# Patient Record
Sex: MALE | Race: WHITE | Employment: FULL TIME | URBAN - METROPOLITAN AREA
[De-identification: names, ages, dates, MRNs, and addresses within clinical notes are randomized per-mention and may not be internally consistent; named-entity substitution may affect disease eponyms.]

---

## 2017-01-31 ENCOUNTER — HOSPITAL ENCOUNTER (OUTPATIENT)
Dept: INFUSION THERAPY | Age: 57
Discharge: HOME | End: 2017-01-31
Attending: INTERNAL MEDICINE | Admitting: INTERNAL MEDICINE

## 2017-01-31 VITALS
DIASTOLIC BLOOD PRESSURE: 78 MMHG | SYSTOLIC BLOOD PRESSURE: 114 MMHG | HEART RATE: 94 BPM | TEMPERATURE: 98.5 F | RESPIRATION RATE: 18 BRPM

## 2017-01-31 ASSESSMENT — PAIN SCALES - GENERAL: PAINLEVEL_OUTOF10: 0

## 2017-03-14 ENCOUNTER — HOSPITAL ENCOUNTER (OUTPATIENT)
Dept: INFUSION THERAPY | Age: 57
Discharge: HOME | End: 2017-03-14
Attending: INTERNAL MEDICINE | Admitting: INTERNAL MEDICINE

## 2017-03-14 VITALS
HEART RATE: 74 BPM | TEMPERATURE: 98 F | SYSTOLIC BLOOD PRESSURE: 156 MMHG | RESPIRATION RATE: 20 BRPM | DIASTOLIC BLOOD PRESSURE: 85 MMHG

## 2017-03-14 ASSESSMENT — PAIN SCALES - GENERAL: PAINLEVEL_OUTOF10: 0

## 2017-04-25 ENCOUNTER — HOSPITAL ENCOUNTER (OUTPATIENT)
Dept: OTHER | Age: 57
Discharge: HOME | End: 2017-04-25
Attending: INTERNAL MEDICINE | Admitting: INTERNAL MEDICINE

## 2017-04-25 ENCOUNTER — HOSPITAL ENCOUNTER (OUTPATIENT)
Dept: INFUSION THERAPY | Age: 57
Discharge: HOME | End: 2017-04-25
Attending: INTERNAL MEDICINE | Admitting: INTERNAL MEDICINE

## 2017-04-25 VITALS — SYSTOLIC BLOOD PRESSURE: 143 MMHG | RESPIRATION RATE: 16 BRPM | HEART RATE: 80 BPM | DIASTOLIC BLOOD PRESSURE: 84 MMHG

## 2017-04-25 LAB
ALBUMIN SERPL-MCNC: 4.3 GM/DL (ref 3.4–5)
ALP BLD-CCNC: 136 IU/L (ref 40–129)
ALT SERPL-CCNC: 35 U/L (ref 10–40)
ANION GAP SERPL CALCULATED.3IONS-SCNC: 13 MMOL/L (ref 4–16)
AST SERPL-CCNC: 37 IU/L (ref 15–37)
BILIRUB SERPL-MCNC: 0.3 MG/DL (ref 0–1)
BUN BLDV-MCNC: 14 MG/DL (ref 6–23)
CALCIUM SERPL-MCNC: 8.8 MG/DL (ref 8.3–10.6)
CHLORIDE BLD-SCNC: 97 MMOL/L (ref 99–110)
CO2: 26 MMOL/L (ref 21–32)
CREAT SERPL-MCNC: 1 MG/DL (ref 0.9–1.3)
GFR AFRICAN AMERICAN: >60 ML/MIN/1.73M2
GFR NON-AFRICAN AMERICAN: >60 ML/MIN/1.73M2
GLUCOSE BLD-MCNC: 248 MG/DL (ref 70–140)
LACTATE DEHYDROGENASE: 179 IU/L (ref 120–246)
POTASSIUM SERPL-SCNC: 4.6 MMOL/L (ref 3.5–5.1)
SODIUM BLD-SCNC: 136 MMOL/L (ref 135–145)
TOTAL PROTEIN: 6.2 GM/DL (ref 6.4–8.2)
URIC ACID: 4.5 MG/DL (ref 3.5–7.2)

## 2017-04-25 ASSESSMENT — PAIN SCALES - GENERAL: PAINLEVEL_OUTOF10: 0

## 2017-06-06 ENCOUNTER — HOSPITAL ENCOUNTER (OUTPATIENT)
Dept: INFUSION THERAPY | Age: 57
Discharge: HOME | End: 2017-07-05
Attending: INTERNAL MEDICINE | Admitting: INTERNAL MEDICINE

## 2017-06-06 VITALS — DIASTOLIC BLOOD PRESSURE: 80 MMHG | RESPIRATION RATE: 20 BRPM | SYSTOLIC BLOOD PRESSURE: 141 MMHG | HEART RATE: 103 BPM

## 2017-06-06 ASSESSMENT — PAIN SCALES - GENERAL: PAINLEVEL_OUTOF10: 0

## 2017-07-10 ENCOUNTER — HOSPITAL ENCOUNTER (OUTPATIENT)
Dept: LAB | Age: 57
Discharge: HOME | End: 2017-07-10
Attending: FAMILY MEDICINE | Admitting: FAMILY MEDICINE

## 2017-07-10 LAB
ALBUMIN SERPL-MCNC: 4.1 GM/DL (ref 3.4–5)
ALP BLD-CCNC: 112 IU/L (ref 40–129)
ALT SERPL-CCNC: 14 U/L (ref 10–40)
ANION GAP SERPL CALCULATED.3IONS-SCNC: 10 MMOL/L (ref 4–16)
AST SERPL-CCNC: 19 IU/L (ref 15–37)
BILIRUB SERPL-MCNC: 0.5 MG/DL (ref 0–1)
BUN BLDV-MCNC: 15 MG/DL (ref 6–23)
CALCIUM SERPL-MCNC: 9.9 MG/DL (ref 8.3–10.6)
CHLORIDE BLD-SCNC: 100 MMOL/L (ref 99–110)
CHOLESTEROL, FASTING: 185 MG/DL
CO2: 27 MMOL/L (ref 21–32)
CREAT SERPL-MCNC: 1 MG/DL (ref 0.9–1.3)
ESTIMATED AVERAGE GLUCOSE: 223 MG/DL
GFR AFRICAN AMERICAN: >60 ML/MIN/1.73M2
GFR NON-AFRICAN AMERICAN: >60 ML/MIN/1.73M2
GLUCOSE FASTING: 177 MG/DL (ref 70–99)
HBA1C MFR BLD: 9.4 % (ref 4.2–6.3)
HDLC SERPL-MCNC: 55 MG/DL
LDL CHOLESTEROL DIRECT: 129 MG/DL
POTASSIUM SERPL-SCNC: 3.6 MMOL/L (ref 3.5–5.1)
PROSTATE SPECIFIC ANTIGEN: 0.41 NG/ML (ref 0–4)
SODIUM BLD-SCNC: 137 MMOL/L (ref 135–145)
TOTAL PROTEIN: 7.1 GM/DL (ref 6.4–8.2)
TRIGLYCERIDE, FASTING: 78 MG/DL

## 2017-07-18 ENCOUNTER — HOSPITAL ENCOUNTER (OUTPATIENT)
Dept: INFUSION THERAPY | Age: 57
Discharge: HOME | End: 2017-07-18
Attending: INTERNAL MEDICINE | Admitting: INTERNAL MEDICINE

## 2017-07-18 VITALS — DIASTOLIC BLOOD PRESSURE: 81 MMHG | HEART RATE: 88 BPM | SYSTOLIC BLOOD PRESSURE: 136 MMHG | RESPIRATION RATE: 18 BRPM

## 2017-07-18 ASSESSMENT — PAIN SCALES - GENERAL: PAINLEVEL_OUTOF10: 0

## 2017-08-29 ENCOUNTER — HOSPITAL ENCOUNTER (OUTPATIENT)
Dept: INFUSION THERAPY | Age: 57
Discharge: HOME | End: 2017-08-29
Attending: INTERNAL MEDICINE | Admitting: INTERNAL MEDICINE

## 2017-08-29 VITALS
DIASTOLIC BLOOD PRESSURE: 73 MMHG | HEART RATE: 97 BPM | OXYGEN SATURATION: 98 % | SYSTOLIC BLOOD PRESSURE: 138 MMHG | RESPIRATION RATE: 18 BRPM

## 2017-10-10 ENCOUNTER — HOSPITAL ENCOUNTER (OUTPATIENT)
Dept: INFUSION THERAPY | Age: 57
Discharge: HOME | End: 2017-10-10
Attending: INTERNAL MEDICINE | Admitting: INTERNAL MEDICINE

## 2017-10-10 ENCOUNTER — HOSPITAL ENCOUNTER (OUTPATIENT)
Dept: OTHER | Age: 57
Discharge: HOME | End: 2017-10-10
Attending: INTERNAL MEDICINE | Admitting: INTERNAL MEDICINE

## 2017-10-10 VITALS
OXYGEN SATURATION: 98 % | HEART RATE: 87 BPM | DIASTOLIC BLOOD PRESSURE: 62 MMHG | RESPIRATION RATE: 18 BRPM | SYSTOLIC BLOOD PRESSURE: 127 MMHG

## 2017-10-10 LAB
ALBUMIN SERPL-MCNC: 4.5 GM/DL (ref 3.4–5)
ALP BLD-CCNC: 104 IU/L (ref 40–129)
ALT SERPL-CCNC: 21 U/L (ref 10–40)
ANION GAP SERPL CALCULATED.3IONS-SCNC: 14 MMOL/L (ref 4–16)
AST SERPL-CCNC: 25 IU/L (ref 15–37)
BILIRUB SERPL-MCNC: 0.3 MG/DL (ref 0–1)
BUN BLDV-MCNC: 13 MG/DL (ref 6–23)
CALCIUM SERPL-MCNC: 9.2 MG/DL (ref 8.3–10.6)
CHLORIDE BLD-SCNC: 99 MMOL/L (ref 99–110)
CO2: 26 MMOL/L (ref 21–32)
CREAT SERPL-MCNC: 1.3 MG/DL (ref 0.9–1.3)
GFR AFRICAN AMERICAN: >60 ML/MIN/1.73M2
GFR NON-AFRICAN AMERICAN: 57 ML/MIN/1.73M2
GLUCOSE BLD-MCNC: 292 MG/DL (ref 70–140)
LACTATE DEHYDROGENASE: 161 IU/L (ref 120–246)
POTASSIUM SERPL-SCNC: 4.9 MMOL/L (ref 3.5–5.1)
SODIUM BLD-SCNC: 139 MMOL/L (ref 135–145)
TOTAL PROTEIN: 6.6 GM/DL (ref 6.4–8.2)
URIC ACID: 4.1 MG/DL (ref 3.5–7.2)

## 2017-10-12 LAB
ALBUMIN ELP: 3.9 GM/DL (ref 3.2–5.6)
ALPHA-1-GLOBULIN: 0.2 GM/DL (ref 0.1–0.4)
ALPHA-2-GLOBULIN: 0.6 GM/DL (ref 0.4–1.2)
BETA GLOBULIN: 1 GM/DL (ref 0.5–1.3)
GAMMA GLOBULIN: 0.9 GM/DL (ref 0.5–1.6)
TOTAL PROTEIN: 6.6 GM/DL (ref 6.4–8.2)

## 2017-11-21 ENCOUNTER — HOSPITAL ENCOUNTER (OUTPATIENT)
Dept: INFUSION THERAPY | Age: 57
Discharge: HOME | End: 2017-11-21
Attending: INTERNAL MEDICINE | Admitting: INTERNAL MEDICINE

## 2017-11-21 VITALS — RESPIRATION RATE: 16 BRPM | SYSTOLIC BLOOD PRESSURE: 133 MMHG | DIASTOLIC BLOOD PRESSURE: 83 MMHG | HEART RATE: 78 BPM

## 2017-11-21 ASSESSMENT — PAIN SCALES - GENERAL: PAINLEVEL_OUTOF10: 0

## 2017-11-21 NOTE — FLOWSHEET NOTE
Diagnosis:Errythrocytosis          Pre-phlebotomy:  Pulse:84  Blood Pressure:  145/71  Post-phlebotomy:  Pulse: 78 Blood Pressure: 133/83  Volume ZSBLEDX:006 gramsComplications:  NoneComments:scale weight 500 grams

## 2017-11-21 NOTE — DISCHARGE SUMMARY
Tolerated Therapeutic Phlebotomy well. Home instructions given with understanding. Discharged walking per self in fair condition  To leave per private auto.

## 2018-01-02 ENCOUNTER — HOSPITAL ENCOUNTER (OUTPATIENT)
Dept: INFUSION THERAPY | Age: 58
Discharge: HOME | End: 2018-01-02
Attending: INTERNAL MEDICINE | Admitting: INTERNAL MEDICINE

## 2018-01-02 VITALS
HEART RATE: 100 BPM | DIASTOLIC BLOOD PRESSURE: 83 MMHG | RESPIRATION RATE: 16 BRPM | OXYGEN SATURATION: 98 % | SYSTOLIC BLOOD PRESSURE: 144 MMHG

## 2018-01-02 NOTE — PROGRESS NOTES
Diagnosis:  erthrocytosis              Pre-phlebotomy:  Pulse:  90   Blood Pressure: 139/80    Post-phlebotomy:  Pulse:  100  Blood Pressure:  144/83    Volume Removed:  [id number]    Complications:  none    Comments:  Draw done by MARIEL Mackey RN

## 2018-01-02 NOTE — PLAN OF CARE
Pt taken to room 00 . Pt oriented to room, call light, bed/chair controls, TV, pt voiced understanding. Plan of care explained to pt, pt voiced understanding.

## 2018-02-13 ENCOUNTER — HOSPITAL ENCOUNTER (OUTPATIENT)
Dept: INFUSION THERAPY | Age: 58
Discharge: HOME | End: 2018-02-13
Attending: INTERNAL MEDICINE | Admitting: INTERNAL MEDICINE

## 2018-02-13 VITALS
DIASTOLIC BLOOD PRESSURE: 75 MMHG | TEMPERATURE: 98.4 F | HEART RATE: 84 BPM | RESPIRATION RATE: 18 BRPM | SYSTOLIC BLOOD PRESSURE: 152 MMHG

## 2018-02-13 ASSESSMENT — PAIN SCALES - GENERAL: PAINLEVEL_OUTOF10: 0

## 2018-02-13 NOTE — PROGRESS NOTES
Pt taken to room 03. Pt oriented to room, call light, bed/chair controls, TV, pt voiced understanding. Plan of care explained to pt, pt voiced understanding.

## 2018-05-08 ENCOUNTER — HOSPITAL ENCOUNTER (OUTPATIENT)
Dept: OTHER | Age: 58
Discharge: HOME | End: 2018-05-08
Attending: INTERNAL MEDICINE | Admitting: INTERNAL MEDICINE

## 2018-05-08 LAB
ALBUMIN SERPL-MCNC: 4.6 GM/DL (ref 3.4–5)
ALP BLD-CCNC: 137 IU/L (ref 40–129)
ALT SERPL-CCNC: 25 U/L (ref 10–40)
ANION GAP SERPL CALCULATED.3IONS-SCNC: 15 MMOL/L (ref 4–16)
AST SERPL-CCNC: 23 IU/L (ref 15–37)
BILIRUB SERPL-MCNC: 0.5 MG/DL (ref 0–1)
BUN BLDV-MCNC: 12 MG/DL (ref 6–23)
CALCIUM SERPL-MCNC: 9.6 MG/DL (ref 8.3–10.6)
CHLORIDE BLD-SCNC: 97 MMOL/L (ref 99–110)
CO2: 27 MMOL/L (ref 21–32)
CREAT SERPL-MCNC: 1 MG/DL (ref 0.9–1.3)
GFR AFRICAN AMERICAN: >60 ML/MIN/1.73M2
GFR NON-AFRICAN AMERICAN: >60 ML/MIN/1.73M2
GLUCOSE BLD-MCNC: 287 MG/DL (ref 70–99)
LACTATE DEHYDROGENASE: 186 IU/L (ref 120–246)
POTASSIUM SERPL-SCNC: 4.1 MMOL/L (ref 3.5–5.1)
SODIUM BLD-SCNC: 139 MMOL/L (ref 135–145)
TOTAL PROTEIN: 6.8 GM/DL (ref 6.4–8.2)

## 2018-05-10 ENCOUNTER — HOSPITAL ENCOUNTER (OUTPATIENT)
Dept: INFUSION THERAPY | Age: 58
Discharge: HOME | End: 2018-05-10
Attending: INTERNAL MEDICINE | Admitting: INTERNAL MEDICINE

## 2018-05-10 VITALS — SYSTOLIC BLOOD PRESSURE: 141 MMHG | HEART RATE: 72 BPM | DIASTOLIC BLOOD PRESSURE: 70 MMHG | RESPIRATION RATE: 16 BRPM

## 2018-05-10 RX ORDER — METOPROLOL SUCCINATE 25 MG/1
25 TABLET, EXTENDED RELEASE ORAL DAILY
COMMUNITY
End: 2021-07-07 | Stop reason: SDUPTHER

## 2018-06-22 ENCOUNTER — HOSPITAL ENCOUNTER (OUTPATIENT)
Dept: INFUSION THERAPY | Age: 58
Discharge: HOME | End: 2018-06-22
Attending: INTERNAL MEDICINE | Admitting: INTERNAL MEDICINE

## 2018-06-22 VITALS — DIASTOLIC BLOOD PRESSURE: 72 MMHG | HEART RATE: 80 BPM | RESPIRATION RATE: 20 BRPM | SYSTOLIC BLOOD PRESSURE: 135 MMHG

## 2018-06-22 ASSESSMENT — PAIN SCALES - GENERAL: PAINLEVEL_OUTOF10: 0

## 2018-08-01 ENCOUNTER — HOSPITAL ENCOUNTER (OUTPATIENT)
Dept: INFUSION THERAPY | Age: 58
Discharge: HOME | End: 2018-08-01
Attending: INTERNAL MEDICINE | Admitting: INTERNAL MEDICINE

## 2018-08-01 VITALS
HEART RATE: 76 BPM | TEMPERATURE: 97.1 F | SYSTOLIC BLOOD PRESSURE: 130 MMHG | RESPIRATION RATE: 18 BRPM | OXYGEN SATURATION: 94 % | DIASTOLIC BLOOD PRESSURE: 75 MMHG

## 2018-08-01 ASSESSMENT — PAIN SCALES - GENERAL: PAINLEVEL_OUTOF10: 0

## 2018-09-25 ENCOUNTER — HOSPITAL ENCOUNTER (OUTPATIENT)
Age: 58
Discharge: HOME | End: 2018-09-25
Payer: COMMERCIAL

## 2018-09-25 LAB
ALBUMIN SERPL-MCNC: 4.5 GM/DL (ref 3.4–5)
ALP BLD-CCNC: 136 IU/L (ref 40–129)
ALT SERPL-CCNC: 24 U/L (ref 10–40)
ANION GAP SERPL CALCULATED.3IONS-SCNC: 13 MMOL/L (ref 4–16)
AST SERPL-CCNC: 25 IU/L (ref 15–37)
BILIRUB SERPL-MCNC: 0.5 MG/DL (ref 0–1)
BUN BLDV-MCNC: 16 MG/DL (ref 6–23)
CALCIUM SERPL-MCNC: 9.9 MG/DL (ref 8.3–10.6)
CHLORIDE BLD-SCNC: 98 MMOL/L (ref 99–110)
CHOLESTEROL, FASTING: 164 MG/DL
CO2: 29 MMOL/L (ref 21–32)
CREAT SERPL-MCNC: 1.1 MG/DL (ref 0.9–1.3)
ESTIMATED AVERAGE GLUCOSE: 220 MG/DL
GFR AFRICAN AMERICAN: >60 ML/MIN/1.73M2
GFR NON-AFRICAN AMERICAN: >60 ML/MIN/1.73M2
GLUCOSE FASTING: 92 MG/DL (ref 70–99)
HBA1C MFR BLD: 9.3 % (ref 4.2–6.3)
HDLC SERPL-MCNC: 48 MG/DL
LDL CHOLESTEROL DIRECT: 110 MG/DL
POTASSIUM SERPL-SCNC: 3.7 MMOL/L (ref 3.5–5.1)
PROSTATE SPECIFIC ANTIGEN: 0.42 NG/ML (ref 0–4)
SODIUM BLD-SCNC: 140 MMOL/L (ref 135–145)
TOTAL PROTEIN: 7.2 GM/DL (ref 6.4–8.2)
TRIGLYCERIDE, FASTING: 77 MG/DL
TSH HIGH SENSITIVITY: 1.72 UIU/ML (ref 0.27–4.2)

## 2018-09-25 PROCEDURE — 36415 COLL VENOUS BLD VENIPUNCTURE: CPT

## 2018-09-25 PROCEDURE — 80061 LIPID PANEL: CPT

## 2018-09-25 PROCEDURE — 80053 COMPREHEN METABOLIC PANEL: CPT

## 2018-09-25 PROCEDURE — 83036 HEMOGLOBIN GLYCOSYLATED A1C: CPT

## 2018-09-25 PROCEDURE — 84443 ASSAY THYROID STIM HORMONE: CPT

## 2018-09-25 PROCEDURE — G0103 PSA SCREENING: HCPCS

## 2018-10-23 ENCOUNTER — HOSPITAL ENCOUNTER (OUTPATIENT)
Age: 58
Setting detail: SPECIMEN
Discharge: HOME | End: 2018-10-23
Payer: COMMERCIAL

## 2018-10-23 LAB
ALBUMIN SERPL-MCNC: 4.3 GM/DL (ref 3.4–5)
ALP BLD-CCNC: 159 IU/L (ref 40–129)
ALT SERPL-CCNC: 38 U/L (ref 10–40)
ANION GAP SERPL CALCULATED.3IONS-SCNC: 17 MMOL/L (ref 4–16)
AST SERPL-CCNC: 31 IU/L (ref 15–37)
BILIRUB SERPL-MCNC: 0.6 MG/DL (ref 0–1)
BUN BLDV-MCNC: 15 MG/DL (ref 6–23)
CALCIUM SERPL-MCNC: 8.9 MG/DL (ref 8.3–10.6)
CHLORIDE BLD-SCNC: 97 MMOL/L (ref 99–110)
CO2: 25 MMOL/L (ref 21–32)
CREAT SERPL-MCNC: 1 MG/DL (ref 0.9–1.3)
GFR AFRICAN AMERICAN: >60 ML/MIN/1.73M2
GFR NON-AFRICAN AMERICAN: >60 ML/MIN/1.73M2
GLUCOSE BLD-MCNC: 308 MG/DL (ref 70–99)
POTASSIUM SERPL-SCNC: 4.2 MMOL/L (ref 3.5–5.1)
SODIUM BLD-SCNC: 139 MMOL/L (ref 135–145)
TOTAL PROTEIN: 6.7 GM/DL (ref 6.4–8.2)

## 2018-10-23 PROCEDURE — 80053 COMPREHEN METABOLIC PANEL: CPT

## 2018-10-25 ENCOUNTER — HOSPITAL ENCOUNTER (OUTPATIENT)
Dept: INFUSION THERAPY | Age: 58
Setting detail: INFUSION SERIES
Discharge: HOME | End: 2018-10-25
Payer: COMMERCIAL

## 2018-10-25 VITALS
TEMPERATURE: 98.1 F | SYSTOLIC BLOOD PRESSURE: 143 MMHG | RESPIRATION RATE: 16 BRPM | DIASTOLIC BLOOD PRESSURE: 89 MMHG | HEART RATE: 91 BPM

## 2018-10-25 PROCEDURE — 99195 PHLEBOTOMY: CPT

## 2018-10-25 NOTE — PROGRESS NOTES
Diagnosis:  ERYHROCYTOSIS              Pre-phlebotomy:  Pulse:   86  Blood Pressure:  142/ 80    Post-phlebotomy:  Pulse:  91   Blood Pressure:  143/89    Volume Removed:  838    Complications:  None    Comments: Tolerated procedure well. No problems. Pt monitored for 30 min post procedure. Ambulated to car per self.

## 2018-10-25 NOTE — PLAN OF CARE
Ambulatory to unit room 6 for Therapeutic Phlebotomy. Orientated to unit. Procedure and plan of care explained. Questions answered. Understanding verbalized.

## 2018-12-18 ENCOUNTER — HOSPITAL ENCOUNTER (OUTPATIENT)
Dept: INFUSION THERAPY | Age: 58
End: 2018-12-18

## 2018-12-19 ENCOUNTER — HOSPITAL ENCOUNTER (OUTPATIENT)
Dept: INFUSION THERAPY | Age: 58
Setting detail: INFUSION SERIES
End: 2018-12-19
Payer: COMMERCIAL

## 2019-02-22 ENCOUNTER — HOSPITAL ENCOUNTER (OUTPATIENT)
Dept: INFUSION THERAPY | Age: 59
Setting detail: INFUSION SERIES
Discharge: HOME | End: 2019-02-22
Payer: COMMERCIAL

## 2019-02-22 VITALS
RESPIRATION RATE: 14 BRPM | OXYGEN SATURATION: 98 % | DIASTOLIC BLOOD PRESSURE: 81 MMHG | SYSTOLIC BLOOD PRESSURE: 133 MMHG | HEART RATE: 84 BPM

## 2019-02-22 PROCEDURE — 99211 OFF/OP EST MAY X REQ PHY/QHP: CPT

## 2019-02-22 PROCEDURE — 99195 PHLEBOTOMY: CPT

## 2019-02-22 NOTE — DISCHARGE SUMMARY
Pt tolerated phlebotomy without any side effects. Agreeable for another tx set-up for next Friday. Pt sat in chair for 30 min post phlebotomy. Ambulated to private auto per self.

## 2019-02-22 NOTE — PROGRESS NOTES
Pt taken to room 5, oriented to room, bed/chair controls, and call light. Needs met at present. Call light in reach. Pt agreeable for plan of care.

## 2019-02-25 NOTE — PROGRESS NOTES
Diagnosis:  d75.1              Pre-phlebotomy:  Pulse:  87   Blood Pressure:  135 79    Post-phlebotomy:  Pulse:  84   Blood Pressure:  133/81    Volume Removed:  538 grams    Complications:  none    Comments: none

## 2019-03-01 ENCOUNTER — HOSPITAL ENCOUNTER (OUTPATIENT)
Dept: INFUSION THERAPY | Age: 59
Setting detail: INFUSION SERIES
Discharge: HOME | End: 2019-03-01
Payer: COMMERCIAL

## 2019-03-01 VITALS
WEIGHT: 198 LBS | RESPIRATION RATE: 14 BRPM | DIASTOLIC BLOOD PRESSURE: 73 MMHG | HEIGHT: 69 IN | TEMPERATURE: 97.8 F | BODY MASS INDEX: 29.33 KG/M2 | HEART RATE: 97 BPM | SYSTOLIC BLOOD PRESSURE: 131 MMHG

## 2019-03-01 PROCEDURE — 99195 PHLEBOTOMY: CPT

## 2019-03-01 PROCEDURE — 99211 OFF/OP EST MAY X REQ PHY/QHP: CPT

## 2019-03-01 NOTE — PROGRESS NOTES
Diagnosis: Erythrocytosis    Pre-Phlebotomy: /71, HR 94    Post-Phlebotomy: BP /71   Pulse 97   Temp 97.8 °F (36.6 °C)   Resp 14   Ht 5' 9\" (1.753 m)   Wt 198 lb (89.8 kg)   BMI 29.24 kg/m²       Volume Removed: 356 grams    Complications: None    Comments: Therapeutic Phlebotomy performed by ADONIS Srivastava RN per pt request    Ester Varela

## 2019-04-23 ENCOUNTER — HOSPITAL ENCOUNTER (OUTPATIENT)
Age: 59
Setting detail: SPECIMEN
Discharge: HOME | End: 2019-04-23
Payer: COMMERCIAL

## 2019-04-23 LAB
ALBUMIN SERPL-MCNC: 4.4 GM/DL (ref 3.4–5)
ALP BLD-CCNC: 152 IU/L (ref 40–128)
ALT SERPL-CCNC: 40 U/L (ref 10–40)
ANION GAP SERPL CALCULATED.3IONS-SCNC: 12 MMOL/L (ref 4–16)
AST SERPL-CCNC: 28 IU/L (ref 15–37)
BILIRUB SERPL-MCNC: 0.5 MG/DL (ref 0–1)
BUN BLDV-MCNC: 15 MG/DL (ref 6–23)
CALCIUM SERPL-MCNC: 9.7 MG/DL (ref 8.3–10.6)
CHLORIDE BLD-SCNC: 99 MMOL/L (ref 99–110)
CO2: 28 MMOL/L (ref 21–32)
CREAT SERPL-MCNC: 0.9 MG/DL (ref 0.9–1.3)
GFR AFRICAN AMERICAN: >60 ML/MIN/1.73M2
GFR NON-AFRICAN AMERICAN: >60 ML/MIN/1.73M2
GLUCOSE BLD-MCNC: 314 MG/DL (ref 70–99)
LACTATE DEHYDROGENASE: 161 IU/L (ref 120–246)
POTASSIUM SERPL-SCNC: 4.6 MMOL/L (ref 3.5–5.1)
SODIUM BLD-SCNC: 139 MMOL/L (ref 135–145)
TOTAL PROTEIN: 6.6 GM/DL (ref 6.4–8.2)

## 2019-04-23 PROCEDURE — 83615 LACTATE (LD) (LDH) ENZYME: CPT

## 2019-04-23 PROCEDURE — 80053 COMPREHEN METABOLIC PANEL: CPT

## 2019-04-25 ENCOUNTER — HOSPITAL ENCOUNTER (OUTPATIENT)
Dept: INFUSION THERAPY | Age: 59
Setting detail: INFUSION SERIES
Discharge: HOME | End: 2019-04-25
Payer: COMMERCIAL

## 2019-04-25 VITALS
DIASTOLIC BLOOD PRESSURE: 76 MMHG | TEMPERATURE: 98.8 F | HEART RATE: 74 BPM | SYSTOLIC BLOOD PRESSURE: 151 MMHG | RESPIRATION RATE: 14 BRPM

## 2019-04-25 PROCEDURE — 99211 OFF/OP EST MAY X REQ PHY/QHP: CPT

## 2019-04-25 PROCEDURE — 99195 PHLEBOTOMY: CPT

## 2019-04-25 ASSESSMENT — PAIN SCALES - GENERAL: PAINLEVEL_OUTOF10: 0

## 2019-04-25 NOTE — PROGRESS NOTES
IV discontinued. DSD applied. Pt tolerated well. Discharged instructions given to pt, pt voiced understanding. Pt discharged via ambulatry by self to exit.

## 2019-04-25 NOTE — PROGRESS NOTES
Diagnosis:  d75.1              Pre-phlebotomy:  Pulse:  76   Blood Pressure:  147 82    Post-phlebotomy:  Pulse:  74   Blood Pressure:  151 76    Volume Removed:  294 grams     Complications:  none    Comments:  none

## 2019-04-25 NOTE — PROGRESS NOTES
Pt taken to room 00 for therapeutic phlembotoy. Pt oriented to room, call light, bed/chair controls, TV, pt voiced understanding. Plan of care explained to pt, pt voiced understanding.

## 2019-04-26 LAB
POST VITAL SIGNS: NORMAL
PRE VITAL SIGNS: NORMAL
TOTAL VOLUME: NORMAL
WITNESS: NORMAL

## 2019-05-07 ENCOUNTER — HOSPITAL ENCOUNTER (OUTPATIENT)
Dept: INFUSION THERAPY | Age: 59
Setting detail: INFUSION SERIES
Discharge: HOME | End: 2019-05-07
Payer: COMMERCIAL

## 2019-05-07 VITALS
OXYGEN SATURATION: 98 % | TEMPERATURE: 98 F | RESPIRATION RATE: 16 BRPM | DIASTOLIC BLOOD PRESSURE: 80 MMHG | HEART RATE: 82 BPM | SYSTOLIC BLOOD PRESSURE: 140 MMHG

## 2019-05-07 PROCEDURE — 99195 PHLEBOTOMY: CPT

## 2019-05-07 PROCEDURE — 99211 OFF/OP EST MAY X REQ PHY/QHP: CPT

## 2019-05-07 ASSESSMENT — PAIN SCALES - GENERAL
PAINLEVEL_OUTOF10: 0
PAINLEVEL_OUTOF10: 0

## 2019-05-07 NOTE — PROGRESS NOTES
Pt taken to room 03 for therapeutic phlebotomy. Pt oriented to room, call light, bed/chair controls, TV, pt voiced understanding. Plan of care explained to pt, pt voiced understanding.

## 2019-05-07 NOTE — PROGRESS NOTES
Diagnosis:  Erythrocytosis              Pre-phlebotomy:  Pulse:  82   Blood Pressure:  124 62    Post-phlebotomy:  Pulse:  82   Blood Pressure:  140 80    Volume Removed:  285 grams    Complications:  none    Comments:  none

## 2019-05-08 LAB
POST VITAL SIGNS: NORMAL
PRE VITAL SIGNS: NORMAL
TOTAL VOLUME: NORMAL
WITNESS: NORMAL

## 2019-07-26 ENCOUNTER — HOSPITAL ENCOUNTER (OUTPATIENT)
Dept: INFUSION THERAPY | Age: 59
Setting detail: INFUSION SERIES
Discharge: HOME | End: 2019-07-26
Payer: COMMERCIAL

## 2019-07-26 VITALS
DIASTOLIC BLOOD PRESSURE: 75 MMHG | TEMPERATURE: 97.4 F | RESPIRATION RATE: 16 BRPM | SYSTOLIC BLOOD PRESSURE: 132 MMHG | HEART RATE: 85 BPM

## 2019-07-26 LAB
POST VITAL SIGNS: NORMAL
PRE VITAL SIGNS: NORMAL
TOTAL VOLUME: NORMAL
WITNESS: NORMAL

## 2019-07-26 PROCEDURE — 99195 PHLEBOTOMY: CPT

## 2019-07-26 PROCEDURE — 99211 OFF/OP EST MAY X REQ PHY/QHP: CPT

## 2019-07-26 NOTE — PROGRESS NOTES
Diagnosis: erythrocytosis    Pre-Phlebotomy: /69, HR 86    Post-Phlebotomy: /75, HR 85    Volume Removed: 940 grams    Complications: none    Comments: none    Lot #: [id number]    Expiration Date: aug 21    Bushra Quijano

## 2019-10-22 ENCOUNTER — HOSPITAL ENCOUNTER (OUTPATIENT)
Age: 59
Setting detail: SPECIMEN
Discharge: HOME | End: 2019-10-22
Payer: COMMERCIAL

## 2019-10-22 LAB
ALBUMIN SERPL-MCNC: 4.3 GM/DL (ref 3.4–5)
ALP BLD-CCNC: 132 IU/L (ref 40–128)
ALT SERPL-CCNC: 20 U/L (ref 10–40)
ANION GAP SERPL CALCULATED.3IONS-SCNC: 13 MMOL/L (ref 4–16)
AST SERPL-CCNC: 22 IU/L (ref 15–37)
BILIRUB SERPL-MCNC: 0.5 MG/DL (ref 0–1)
BUN BLDV-MCNC: 18 MG/DL (ref 6–23)
CALCIUM SERPL-MCNC: 9.1 MG/DL (ref 8.3–10.6)
CHLORIDE BLD-SCNC: 97 MMOL/L (ref 99–110)
CO2: 26 MMOL/L (ref 21–32)
CREAT SERPL-MCNC: 1.3 MG/DL (ref 0.9–1.3)
GFR AFRICAN AMERICAN: >60 ML/MIN/1.73M2
GFR NON-AFRICAN AMERICAN: 57 ML/MIN/1.73M2
GLUCOSE BLD-MCNC: 242 MG/DL (ref 70–99)
LACTATE DEHYDROGENASE: 196 IU/L (ref 120–246)
POTASSIUM SERPL-SCNC: 4.5 MMOL/L (ref 3.5–5.1)
SODIUM BLD-SCNC: 136 MMOL/L (ref 135–145)
TOTAL PROTEIN: 6.4 GM/DL (ref 6.4–8.2)

## 2019-10-22 PROCEDURE — 83615 LACTATE (LD) (LDH) ENZYME: CPT

## 2019-10-22 PROCEDURE — 80053 COMPREHEN METABOLIC PANEL: CPT

## 2019-10-30 ENCOUNTER — HOSPITAL ENCOUNTER (OUTPATIENT)
Dept: INFUSION THERAPY | Age: 59
Setting detail: INFUSION SERIES
Discharge: HOME | End: 2019-10-30
Payer: COMMERCIAL

## 2019-10-30 VITALS
HEART RATE: 101 BPM | TEMPERATURE: 98.3 F | RESPIRATION RATE: 14 BRPM | DIASTOLIC BLOOD PRESSURE: 80 MMHG | OXYGEN SATURATION: 98 % | SYSTOLIC BLOOD PRESSURE: 138 MMHG

## 2019-10-30 PROCEDURE — 99211 OFF/OP EST MAY X REQ PHY/QHP: CPT

## 2019-10-30 PROCEDURE — 99195 PHLEBOTOMY: CPT

## 2019-10-30 NOTE — PROGRESS NOTES
Diagnosis: Polycythemia Vera    Pre-Phlebotomy: /88, HR 97    Post-Phlebotomy: /80,     Volume Removed: 262 grams    Complications: None    Comments: Tolerated well.     LOT # [id number]  EXP: 6-8120      Tia Membreno

## 2019-10-31 LAB
POST VITAL SIGNS: NORMAL
PRE VITAL SIGNS: NORMAL
TOTAL VOLUME: NORMAL
WITNESS: NORMAL

## 2019-12-12 ENCOUNTER — HOSPITAL ENCOUNTER (OUTPATIENT)
Age: 59
Discharge: HOME | End: 2019-12-12
Payer: COMMERCIAL

## 2019-12-12 LAB
ALBUMIN SERPL-MCNC: 4.3 GM/DL (ref 3.4–5)
ALP BLD-CCNC: 148 IU/L (ref 40–129)
ALT SERPL-CCNC: 42 U/L (ref 10–40)
ANION GAP SERPL CALCULATED.3IONS-SCNC: 12 MMOL/L (ref 4–16)
AST SERPL-CCNC: 41 IU/L (ref 15–37)
BILIRUB SERPL-MCNC: 0.5 MG/DL (ref 0–1)
BUN BLDV-MCNC: 17 MG/DL (ref 6–23)
CALCIUM SERPL-MCNC: 9.4 MG/DL (ref 8.3–10.6)
CHLORIDE BLD-SCNC: 99 MMOL/L (ref 99–110)
CHOLESTEROL, FASTING: 194 MG/DL
CO2: 28 MMOL/L (ref 21–32)
CREAT SERPL-MCNC: 1 MG/DL (ref 0.9–1.3)
CREATININE URINE: 165.3 MG/DL (ref 39–259)
ESTIMATED AVERAGE GLUCOSE: 206 MG/DL
GFR AFRICAN AMERICAN: >60 ML/MIN/1.73M2
GFR NON-AFRICAN AMERICAN: >60 ML/MIN/1.73M2
GLUCOSE FASTING: 92 MG/DL (ref 70–99)
HBA1C MFR BLD: 8.8 % (ref 4.2–6.3)
HDLC SERPL-MCNC: 51 MG/DL
LDL CHOLESTEROL DIRECT: 142 MG/DL
MICROALBUMIN/CREAT 24H UR: 3.1 MG/DL
MICROALBUMIN/CREAT UR-RTO: 18.8 MG/G CREAT (ref 0–30)
POTASSIUM SERPL-SCNC: 3.7 MMOL/L (ref 3.5–5.1)
PROSTATE SPECIFIC ANTIGEN: 0.4 NG/ML (ref 0–4)
SODIUM BLD-SCNC: 139 MMOL/L (ref 135–145)
TOTAL PROTEIN: 6.9 GM/DL (ref 6.4–8.2)
TRIGLYCERIDE, FASTING: 48 MG/DL
TSH HIGH SENSITIVITY: 1.78 UIU/ML (ref 0.27–4.2)

## 2019-12-12 PROCEDURE — 36415 COLL VENOUS BLD VENIPUNCTURE: CPT

## 2019-12-12 PROCEDURE — 80053 COMPREHEN METABOLIC PANEL: CPT

## 2019-12-12 PROCEDURE — G0103 PSA SCREENING: HCPCS

## 2019-12-12 PROCEDURE — 83036 HEMOGLOBIN GLYCOSYLATED A1C: CPT

## 2019-12-12 PROCEDURE — 84443 ASSAY THYROID STIM HORMONE: CPT

## 2019-12-12 PROCEDURE — 82043 UR ALBUMIN QUANTITATIVE: CPT

## 2019-12-12 PROCEDURE — 82570 ASSAY OF URINE CREATININE: CPT

## 2019-12-12 PROCEDURE — 80061 LIPID PANEL: CPT

## 2020-01-21 ENCOUNTER — HOSPITAL ENCOUNTER (OUTPATIENT)
Dept: INFUSION THERAPY | Age: 60
Discharge: HOME | End: 2020-01-21
Payer: COMMERCIAL

## 2020-01-21 LAB
ALBUMIN SERPL-MCNC: 4.6 GM/DL (ref 3.4–5)
ALP BLD-CCNC: 209 IU/L (ref 40–128)
ALT SERPL-CCNC: 64 U/L (ref 10–40)
ANION GAP SERPL CALCULATED.3IONS-SCNC: 13 MMOL/L (ref 4–16)
AST SERPL-CCNC: 37 IU/L (ref 15–37)
BASOPHILS ABSOLUTE: 0.1 K/CU MM
BASOPHILS RELATIVE PERCENT: 2 % (ref 0–1)
BILIRUB SERPL-MCNC: 0.6 MG/DL (ref 0–1)
BUN BLDV-MCNC: 21 MG/DL (ref 6–23)
CALCIUM SERPL-MCNC: 9.5 MG/DL (ref 8.3–10.6)
CHLORIDE BLD-SCNC: 95 MMOL/L (ref 99–110)
CO2: 26 MMOL/L (ref 21–32)
CREAT SERPL-MCNC: 1 MG/DL (ref 0.9–1.3)
DIFFERENTIAL TYPE: ABNORMAL
EOSINOPHILS ABSOLUTE: 0.1 K/CU MM
EOSINOPHILS RELATIVE PERCENT: 2 % (ref 0–3)
GFR AFRICAN AMERICAN: >60 ML/MIN/1.73M2
GFR NON-AFRICAN AMERICAN: >60 ML/MIN/1.73M2
GLUCOSE BLD-MCNC: 479 MG/DL (ref 70–99)
HCT VFR BLD CALC: 48.1 % (ref 42–52)
HEMOGLOBIN: 17.4 GM/DL (ref 13.5–18)
LACTATE DEHYDROGENASE: 184 IU/L (ref 120–246)
LYMPHOCYTES ABSOLUTE: 1.3 K/CU MM
LYMPHOCYTES RELATIVE PERCENT: 24 % (ref 24–44)
MCH RBC QN AUTO: 34.5 PG (ref 27–31)
MCHC RBC AUTO-ENTMCNC: 36.2 % (ref 32–36)
MCV RBC AUTO: 95.2 FL (ref 78–100)
MONOCYTES ABSOLUTE: 0.2 K/CU MM
MONOCYTES RELATIVE PERCENT: 4 % (ref 0–4)
PDW BLD-RTO: 14.5 % (ref 11.7–14.9)
PLATELET # BLD: 265 K/CU MM (ref 140–440)
PMV BLD AUTO: 9.5 FL (ref 7.5–11.1)
POTASSIUM SERPL-SCNC: 4.3 MMOL/L (ref 3.5–5.1)
RBC # BLD: 5.05 M/CU MM (ref 4.6–6.2)
SEGMENTED NEUTROPHILS ABSOLUTE COUNT: 3.9 K/CU MM
SEGMENTED NEUTROPHILS RELATIVE PERCENT: 68 % (ref 36–66)
SODIUM BLD-SCNC: 134 MMOL/L (ref 135–145)
TOTAL PROTEIN: 7 GM/DL (ref 6.4–8.2)
WBC # BLD: 5.6 K/CU MM (ref 4–10.5)

## 2020-01-21 PROCEDURE — 85025 COMPLETE CBC W/AUTO DIFF WBC: CPT

## 2020-01-21 PROCEDURE — 83615 LACTATE (LD) (LDH) ENZYME: CPT

## 2020-01-21 PROCEDURE — 36415 COLL VENOUS BLD VENIPUNCTURE: CPT

## 2020-01-21 PROCEDURE — 80053 COMPREHEN METABOLIC PANEL: CPT

## 2020-01-22 ENCOUNTER — APPOINTMENT (OUTPATIENT)
Dept: INFUSION THERAPY | Age: 60
End: 2020-01-22
Payer: COMMERCIAL

## 2020-01-23 ENCOUNTER — HOSPITAL ENCOUNTER (OUTPATIENT)
Dept: INFUSION THERAPY | Age: 60
Setting detail: INFUSION SERIES
Discharge: HOME | End: 2020-01-23
Payer: COMMERCIAL

## 2020-01-23 VITALS
DIASTOLIC BLOOD PRESSURE: 75 MMHG | RESPIRATION RATE: 16 BRPM | HEART RATE: 97 BPM | SYSTOLIC BLOOD PRESSURE: 143 MMHG | TEMPERATURE: 98.1 F

## 2020-01-23 PROCEDURE — 99211 OFF/OP EST MAY X REQ PHY/QHP: CPT

## 2020-01-23 PROCEDURE — 99195 PHLEBOTOMY: CPT

## 2020-01-23 NOTE — PROGRESS NOTES
Diagnosis:d45    Pre-Phlebotomy: /83, HR 92    Post-Phlebotomy: /75, HR 97    Volume Removed: 872 grams    Complications: none     Comments: none     LOT # [id number]  EXP: 5-5686      Ruma Vale

## 2020-01-23 NOTE — PROGRESS NOTES
Tolerated appointment well. Reviewed discharge instructions, understanding verbalized. Copies of AVS given to take home. Patient discharged home. Down to exit via steady gait. No orders of the defined types were placed in this encounter.

## 2020-01-25 LAB
POST VITAL SIGNS: NORMAL
PRE VITAL SIGNS: NORMAL
TOTAL VOLUME: NORMAL
WITNESS: NORMAL

## 2020-04-21 PROBLEM — E11.9 DIABETES MELLITUS WITHOUT COMPLICATION (HCC): Status: ACTIVE | Noted: 2020-04-21

## 2020-06-16 ENCOUNTER — HOSPITAL ENCOUNTER (OUTPATIENT)
Dept: INFUSION THERAPY | Age: 60
Discharge: HOME | End: 2020-06-16
Payer: COMMERCIAL

## 2020-06-16 LAB
ALBUMIN SERPL-MCNC: 4.6 GM/DL (ref 3.4–5)
ALP BLD-CCNC: 142 IU/L (ref 40–129)
ALT SERPL-CCNC: 27 U/L (ref 10–40)
ANION GAP SERPL CALCULATED.3IONS-SCNC: 12 MMOL/L (ref 4–16)
AST SERPL-CCNC: 30 IU/L (ref 15–37)
BASOPHILS ABSOLUTE: 0.2 K/CU MM
BASOPHILS RELATIVE PERCENT: 3.3 % (ref 0–1)
BILIRUB SERPL-MCNC: 0.9 MG/DL (ref 0–1)
BUN BLDV-MCNC: 15 MG/DL (ref 6–23)
CALCIUM SERPL-MCNC: 9.1 MG/DL (ref 8.3–10.6)
CHLORIDE BLD-SCNC: 101 MMOL/L (ref 99–110)
CO2: 26 MMOL/L (ref 21–32)
CREAT SERPL-MCNC: 1 MG/DL (ref 0.9–1.3)
DIFFERENTIAL TYPE: ABNORMAL
EOSINOPHILS ABSOLUTE: 0.1 K/CU MM
EOSINOPHILS RELATIVE PERCENT: 2 % (ref 0–3)
GFR AFRICAN AMERICAN: >60 ML/MIN/1.73M2
GFR NON-AFRICAN AMERICAN: >60 ML/MIN/1.73M2
GLUCOSE BLD-MCNC: 179 MG/DL (ref 70–99)
HCT VFR BLD CALC: 45.5 % (ref 42–52)
HEMOGLOBIN: 16.6 GM/DL (ref 13.5–18)
LACTATE DEHYDROGENASE: 192 IU/L (ref 120–246)
LYMPHOCYTES ABSOLUTE: 1.4 K/CU MM
LYMPHOCYTES RELATIVE PERCENT: 25.1 % (ref 24–44)
MCH RBC QN AUTO: 34.5 PG (ref 27–31)
MCHC RBC AUTO-ENTMCNC: 36.5 % (ref 32–36)
MCV RBC AUTO: 94.6 FL (ref 78–100)
MONOCYTES ABSOLUTE: 0.2 K/CU MM
MONOCYTES RELATIVE PERCENT: 4.4 % (ref 0–4)
PDW BLD-RTO: 15.1 % (ref 11.7–14.9)
PLATELET # BLD: 364 K/CU MM (ref 140–440)
PMV BLD AUTO: 9.3 FL (ref 7.5–11.1)
POTASSIUM SERPL-SCNC: 4 MMOL/L (ref 3.5–5.1)
RBC # BLD: 4.81 M/CU MM (ref 4.6–6.2)
SEGMENTED NEUTROPHILS ABSOLUTE COUNT: 3.5 K/CU MM
SEGMENTED NEUTROPHILS RELATIVE PERCENT: 65.2 % (ref 36–66)
SODIUM BLD-SCNC: 139 MMOL/L (ref 135–145)
TOTAL PROTEIN: 6.7 GM/DL (ref 6.4–8.2)
WBC # BLD: 5.4 K/CU MM (ref 4–10.5)

## 2020-06-16 PROCEDURE — 99211 OFF/OP EST MAY X REQ PHY/QHP: CPT

## 2020-06-16 PROCEDURE — 83615 LACTATE (LD) (LDH) ENZYME: CPT

## 2020-06-16 PROCEDURE — 85025 COMPLETE CBC W/AUTO DIFF WBC: CPT

## 2020-06-16 PROCEDURE — 36415 COLL VENOUS BLD VENIPUNCTURE: CPT

## 2020-06-16 PROCEDURE — 80053 COMPREHEN METABOLIC PANEL: CPT

## 2020-06-24 ENCOUNTER — TELEPHONE (OUTPATIENT)
Dept: INFUSION THERAPY | Age: 60
End: 2020-06-24

## 2020-06-25 ENCOUNTER — TELEPHONE (OUTPATIENT)
Dept: INFUSION THERAPY | Age: 60
End: 2020-06-25

## 2020-10-20 ENCOUNTER — HOSPITAL ENCOUNTER (OUTPATIENT)
Dept: INFUSION THERAPY | Age: 60
Discharge: HOME | End: 2020-10-20
Payer: COMMERCIAL

## 2020-10-20 DIAGNOSIS — D75.1 POLYCYTHEMIA, SECONDARY: ICD-10-CM

## 2020-10-20 LAB
ALBUMIN SERPL-MCNC: 4.8 GM/DL (ref 3.4–5)
ALP BLD-CCNC: 157 IU/L (ref 40–128)
ALT SERPL-CCNC: 37 U/L (ref 10–40)
ANION GAP SERPL CALCULATED.3IONS-SCNC: 11 MMOL/L (ref 4–16)
AST SERPL-CCNC: 26 IU/L (ref 15–37)
BASOPHILS ABSOLUTE: 0.2 K/CU MM
BASOPHILS RELATIVE PERCENT: 2.2 % (ref 0–1)
BILIRUB SERPL-MCNC: 0.5 MG/DL (ref 0–1)
BUN BLDV-MCNC: 17 MG/DL (ref 6–23)
CALCIUM SERPL-MCNC: 9.8 MG/DL (ref 8.3–10.6)
CHLORIDE BLD-SCNC: 99 MMOL/L (ref 99–110)
CO2: 30 MMOL/L (ref 21–32)
CREAT SERPL-MCNC: 1.1 MG/DL (ref 0.9–1.3)
DIFFERENTIAL TYPE: ABNORMAL
EOSINOPHILS ABSOLUTE: 0.1 K/CU MM
EOSINOPHILS RELATIVE PERCENT: 1.7 % (ref 0–3)
GFR AFRICAN AMERICAN: >60 ML/MIN/1.73M2
GFR NON-AFRICAN AMERICAN: >60 ML/MIN/1.73M2
GLUCOSE BLD-MCNC: 85 MG/DL (ref 70–99)
HCT VFR BLD CALC: 47.1 % (ref 42–52)
HEMOGLOBIN: 17.4 GM/DL (ref 13.5–18)
LACTATE DEHYDROGENASE: 185 IU/L (ref 120–246)
LYMPHOCYTES ABSOLUTE: 1.8 K/CU MM
LYMPHOCYTES RELATIVE PERCENT: 22.4 % (ref 24–44)
MCH RBC QN AUTO: 35.2 PG (ref 27–31)
MCHC RBC AUTO-ENTMCNC: 36.9 % (ref 32–36)
MCV RBC AUTO: 95.2 FL (ref 78–100)
MONOCYTES ABSOLUTE: 0.4 K/CU MM
MONOCYTES RELATIVE PERCENT: 5.4 % (ref 0–4)
PDW BLD-RTO: 14.2 % (ref 11.7–14.9)
PLATELET # BLD: 391 K/CU MM (ref 140–440)
PMV BLD AUTO: 9.4 FL (ref 7.5–11.1)
POTASSIUM SERPL-SCNC: 4.1 MMOL/L (ref 3.5–5.1)
RBC # BLD: 4.95 M/CU MM (ref 4.6–6.2)
SEGMENTED NEUTROPHILS ABSOLUTE COUNT: 5.4 K/CU MM
SEGMENTED NEUTROPHILS RELATIVE PERCENT: 68.3 % (ref 36–66)
SODIUM BLD-SCNC: 140 MMOL/L (ref 135–145)
TOTAL PROTEIN: 6.9 GM/DL (ref 6.4–8.2)
WBC # BLD: 7.8 K/CU MM (ref 4–10.5)

## 2020-10-20 PROCEDURE — 85025 COMPLETE CBC W/AUTO DIFF WBC: CPT

## 2020-10-20 PROCEDURE — 83615 LACTATE (LD) (LDH) ENZYME: CPT

## 2020-10-20 PROCEDURE — 36415 COLL VENOUS BLD VENIPUNCTURE: CPT

## 2020-10-20 PROCEDURE — 80053 COMPREHEN METABOLIC PANEL: CPT

## 2020-11-05 ENCOUNTER — TELEPHONE (OUTPATIENT)
Dept: ONCOLOGY | Age: 60
End: 2020-11-05

## 2020-11-05 NOTE — TELEPHONE ENCOUNTER
Patient called states he was in on 10/20 for labs and thought he was supposed to be getting a phlebotomy, I don't see an order in system, can you please advise

## 2020-11-06 RX ORDER — 0.9 % SODIUM CHLORIDE 0.9 %
250 INTRAVENOUS SOLUTION INTRAVENOUS ONCE
Status: CANCELLED | OUTPATIENT
Start: 2020-11-11

## 2020-11-06 RX ORDER — 0.9 % SODIUM CHLORIDE 0.9 %
500 INTRAVENOUS SOLUTION INTRAVENOUS ONCE
Status: CANCELLED | OUTPATIENT
Start: 2020-11-11

## 2020-11-11 ENCOUNTER — HOSPITAL ENCOUNTER (OUTPATIENT)
Dept: INFUSION THERAPY | Age: 60
Setting detail: INFUSION SERIES
Discharge: HOME | End: 2020-11-11
Payer: COMMERCIAL

## 2020-11-11 VITALS
RESPIRATION RATE: 14 BRPM | DIASTOLIC BLOOD PRESSURE: 75 MMHG | SYSTOLIC BLOOD PRESSURE: 149 MMHG | TEMPERATURE: 33.8 F | HEART RATE: 88 BPM

## 2020-11-11 DIAGNOSIS — D75.1 POLYCYTHEMIA, SECONDARY: Primary | ICD-10-CM

## 2020-11-11 PROCEDURE — 99211 OFF/OP EST MAY X REQ PHY/QHP: CPT

## 2020-11-11 PROCEDURE — 99195 PHLEBOTOMY: CPT

## 2020-11-11 RX ORDER — 0.9 % SODIUM CHLORIDE 0.9 %
500 INTRAVENOUS SOLUTION INTRAVENOUS ONCE
Status: CANCELLED | OUTPATIENT
Start: 2020-11-11

## 2020-11-11 RX ORDER — 0.9 % SODIUM CHLORIDE 0.9 %
250 INTRAVENOUS SOLUTION INTRAVENOUS ONCE
Status: CANCELLED | OUTPATIENT
Start: 2020-11-11

## 2020-11-11 NOTE — PROGRESS NOTES
Diagnosis: polycythemia vera     Pre-Phlebotomy: /85, HR 85    Post-Phlebotomy: /75, HR 88    Volume Removed: 585 grams    Complications: none    Comments: none        LOT#[id number]    Exp: 8-22    LOT# [id number]    Exp: 7-2022      Oumar Higuera

## 2020-12-24 ENCOUNTER — HOSPITAL ENCOUNTER (OUTPATIENT)
Age: 60
Discharge: HOME | End: 2020-12-24
Payer: COMMERCIAL

## 2020-12-24 LAB
ALBUMIN SERPL-MCNC: 4.3 GM/DL (ref 3.4–5)
ALP BLD-CCNC: 131 IU/L (ref 40–129)
ALT SERPL-CCNC: 40 U/L (ref 10–40)
ANION GAP SERPL CALCULATED.3IONS-SCNC: 6 MMOL/L (ref 4–16)
AST SERPL-CCNC: 41 IU/L (ref 15–37)
BILIRUB SERPL-MCNC: 0.6 MG/DL (ref 0–1)
BUN BLDV-MCNC: 19 MG/DL (ref 6–23)
CALCIUM SERPL-MCNC: 8.8 MG/DL (ref 8.3–10.6)
CHLORIDE BLD-SCNC: 100 MMOL/L (ref 99–110)
CHOLESTEROL, FASTING: 164 MG/DL
CO2: 31 MMOL/L (ref 21–32)
CREAT SERPL-MCNC: 1.1 MG/DL (ref 0.9–1.3)
CREATININE URINE: 68.6 MG/DL (ref 39–259)
ESTIMATED AVERAGE GLUCOSE: 206 MG/DL
GFR AFRICAN AMERICAN: >60 ML/MIN/1.73M2
GFR NON-AFRICAN AMERICAN: >60 ML/MIN/1.73M2
GLUCOSE FASTING: 248 MG/DL (ref 70–99)
HBA1C MFR BLD: 8.8 % (ref 4.2–6.3)
HDLC SERPL-MCNC: 45 MG/DL
LDL CHOLESTEROL DIRECT: 113 MG/DL
MICROALBUMIN/CREAT 24H UR: 1.3 MG/DL
MICROALBUMIN/CREAT UR-RTO: 19 MG/G CREAT (ref 0–30)
POTASSIUM SERPL-SCNC: 4 MMOL/L (ref 3.5–5.1)
PROSTATE SPECIFIC ANTIGEN: 0.46 NG/ML (ref 0–4)
SODIUM BLD-SCNC: 137 MMOL/L (ref 135–145)
TOTAL PROTEIN: 6.5 GM/DL (ref 6.4–8.2)
TRIGLYCERIDE, FASTING: 59 MG/DL

## 2020-12-24 PROCEDURE — 80053 COMPREHEN METABOLIC PANEL: CPT

## 2020-12-24 PROCEDURE — 80061 LIPID PANEL: CPT

## 2020-12-24 PROCEDURE — G0103 PSA SCREENING: HCPCS

## 2020-12-24 PROCEDURE — 36415 COLL VENOUS BLD VENIPUNCTURE: CPT

## 2020-12-24 PROCEDURE — 82043 UR ALBUMIN QUANTITATIVE: CPT

## 2020-12-24 PROCEDURE — 82570 ASSAY OF URINE CREATININE: CPT

## 2020-12-24 PROCEDURE — 83036 HEMOGLOBIN GLYCOSYLATED A1C: CPT

## 2021-03-02 ENCOUNTER — OFFICE VISIT (OUTPATIENT)
Dept: ONCOLOGY | Age: 61
End: 2021-03-02
Payer: COMMERCIAL

## 2021-03-02 ENCOUNTER — HOSPITAL ENCOUNTER (OUTPATIENT)
Dept: INFUSION THERAPY | Age: 61
Discharge: HOME | End: 2021-03-02
Payer: COMMERCIAL

## 2021-03-02 VITALS
TEMPERATURE: 98.6 F | HEART RATE: 89 BPM | RESPIRATION RATE: 16 BRPM | WEIGHT: 205.8 LBS | OXYGEN SATURATION: 98 % | BODY MASS INDEX: 31.19 KG/M2 | SYSTOLIC BLOOD PRESSURE: 137 MMHG | HEIGHT: 68 IN | DIASTOLIC BLOOD PRESSURE: 78 MMHG

## 2021-03-02 DIAGNOSIS — D75.1 POLYCYTHEMIA, SECONDARY: ICD-10-CM

## 2021-03-02 DIAGNOSIS — D75.1 POLYCYTHEMIA, SECONDARY: Primary | ICD-10-CM

## 2021-03-02 LAB
ALBUMIN SERPL-MCNC: 4.5 GM/DL (ref 3.4–5)
ALP BLD-CCNC: 135 IU/L (ref 40–128)
ALT SERPL-CCNC: 37 U/L (ref 10–40)
ANION GAP SERPL CALCULATED.3IONS-SCNC: 10 MMOL/L (ref 4–16)
AST SERPL-CCNC: 31 IU/L (ref 15–37)
BASOPHILS ABSOLUTE: 0.1 K/CU MM
BASOPHILS RELATIVE PERCENT: 2.1 % (ref 0–1)
BILIRUB SERPL-MCNC: 0.5 MG/DL (ref 0–1)
BUN BLDV-MCNC: 18 MG/DL (ref 6–23)
CALCIUM SERPL-MCNC: 9.4 MG/DL (ref 8.3–10.6)
CHLORIDE BLD-SCNC: 99 MMOL/L (ref 99–110)
CO2: 29 MMOL/L (ref 21–32)
CREAT SERPL-MCNC: 1.1 MG/DL (ref 0.9–1.3)
DIFFERENTIAL TYPE: ABNORMAL
EOSINOPHILS ABSOLUTE: 0.1 K/CU MM
EOSINOPHILS RELATIVE PERCENT: 1.9 % (ref 0–3)
ERYTHROCYTE SEDIMENTATION RATE: 5 MM/HR (ref 0–20)
GFR AFRICAN AMERICAN: >60 ML/MIN/1.73M2
GFR NON-AFRICAN AMERICAN: >60 ML/MIN/1.73M2
GLUCOSE BLD-MCNC: 245 MG/DL (ref 70–99)
HCT VFR BLD CALC: 44.6 % (ref 42–52)
HEMOGLOBIN: 16.2 GM/DL (ref 13.5–18)
LACTATE DEHYDROGENASE: 165 IU/L (ref 120–246)
LYMPHOCYTES ABSOLUTE: 1.3 K/CU MM
LYMPHOCYTES RELATIVE PERCENT: 22.5 % (ref 24–44)
MCH RBC QN AUTO: 34.8 PG (ref 27–31)
MCHC RBC AUTO-ENTMCNC: 36.3 % (ref 32–36)
MCV RBC AUTO: 95.7 FL (ref 78–100)
MONOCYTES ABSOLUTE: 0.3 K/CU MM
MONOCYTES RELATIVE PERCENT: 4.7 % (ref 0–4)
PDW BLD-RTO: 14.9 % (ref 11.7–14.9)
PLATELET # BLD: 391 K/CU MM (ref 140–440)
PMV BLD AUTO: 9.6 FL (ref 7.5–11.1)
POTASSIUM SERPL-SCNC: 4 MMOL/L (ref 3.5–5.1)
RBC # BLD: 4.66 M/CU MM (ref 4.6–6.2)
SEGMENTED NEUTROPHILS ABSOLUTE COUNT: 3.9 K/CU MM
SEGMENTED NEUTROPHILS RELATIVE PERCENT: 68.8 % (ref 36–66)
SODIUM BLD-SCNC: 138 MMOL/L (ref 135–145)
TOTAL PROTEIN: 6.7 GM/DL (ref 6.4–8.2)
WBC # BLD: 5.7 K/CU MM (ref 4–10.5)

## 2021-03-02 PROCEDURE — G8417 CALC BMI ABV UP PARAM F/U: HCPCS | Performed by: INTERNAL MEDICINE

## 2021-03-02 PROCEDURE — 36415 COLL VENOUS BLD VENIPUNCTURE: CPT

## 2021-03-02 PROCEDURE — 80053 COMPREHEN METABOLIC PANEL: CPT

## 2021-03-02 PROCEDURE — 99211 OFF/OP EST MAY X REQ PHY/QHP: CPT

## 2021-03-02 PROCEDURE — G8427 DOCREV CUR MEDS BY ELIG CLIN: HCPCS | Performed by: INTERNAL MEDICINE

## 2021-03-02 PROCEDURE — 1036F TOBACCO NON-USER: CPT | Performed by: INTERNAL MEDICINE

## 2021-03-02 PROCEDURE — 99213 OFFICE O/P EST LOW 20 MIN: CPT | Performed by: INTERNAL MEDICINE

## 2021-03-02 PROCEDURE — 3017F COLORECTAL CA SCREEN DOC REV: CPT | Performed by: INTERNAL MEDICINE

## 2021-03-02 PROCEDURE — 85025 COMPLETE CBC W/AUTO DIFF WBC: CPT

## 2021-03-02 PROCEDURE — 85652 RBC SED RATE AUTOMATED: CPT

## 2021-03-02 PROCEDURE — G8484 FLU IMMUNIZE NO ADMIN: HCPCS | Performed by: INTERNAL MEDICINE

## 2021-03-02 PROCEDURE — 83615 LACTATE (LD) (LDH) ENZYME: CPT

## 2021-03-02 ASSESSMENT — PATIENT HEALTH QUESTIONNAIRE - PHQ9
1. LITTLE INTEREST OR PLEASURE IN DOING THINGS: 0
SUM OF ALL RESPONSES TO PHQ QUESTIONS 1-9: 0
2. FEELING DOWN, DEPRESSED OR HOPELESS: 0
SUM OF ALL RESPONSES TO PHQ QUESTIONS 1-9: 0

## 2021-03-02 NOTE — PROGRESS NOTES
that on regular basis to prevent JAK2 positive myeloproliferative neoplasm induced thromboembolic episodes. He doesn't have hepato-splenomegaly on physical examination. I will monitor his blood count every four month interval and will do phlebotomy as needed to keep his hematocrit less than 45%. He doesn't have any complaint on today visit. PAST MEDICAL HISTORY:  1. Hypertension since 2012, currently on lisinopril. 2. diabetes since , currently on Lantus insulin and Humalog. He was hospitalized for ketoacidosis in . 3. CKD, for which he is seeing Dr. Berlin Barthel. 4. His P Shahzad Snyder was identified  in 2013.  5. Superficial phlebitis in  after he had laser surgery for varicose veins, especially on the left side. 6. History of migraines since .  7. He had left retinal hemorrhage ? detected by Dr Avril Davila when he went for his Glaucoma f/up in 2014 and was sent to Dr Kennedy Díaz and started on treatment  with Avastin for the same. His eye Sx have since improved. Kera Ponce FAMILY HISTORY:  Father  at age 28 of lung cancer. Uncle Deirdre Gilbert) had some form of blood disorder and  in . He had a ruptured spleen and cancer at age 68. He has one sister, Eliz Nunez, and the other one, Yesy Lima, is a Hospice nurse. PERSONAL HISTORY:  He is a nonsmoker, nondrinker. He lives with his wife, DION, of 23 years. They have one daughter who is 23years old, studying at [employer]. 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 Oncology History    No history exists. Review of Systems: \"Per interval history; otherwise 10 point ROS is negative. \"  His energy level is pretty good, appetite, and sleep are stable. He denies fever, chills, night sweats, cough, shortness of breath, chest pain, hemoptysis or palpitations.   His bowel and bladder functions are normal. He doesn't have nausea, vomiting, abdominal pain, diarrhea, constipation, dysuria, loss of appetite or weight loss. He doesn't have neuropathy and he denies bleeding or clotting issues. He denies any pain in his body. He doesn't have anxiety or depression. The rest of the systems are unremarkable. Vital Signs:  /78 (Site: Right Upper Arm, Position: Sitting, Cuff Size: Medium Adult)   Pulse 89   Temp 98.6 °F (37 °C) (Infrared)   Resp 16   Ht 5' 8\" (1.727 m)   Wt 205 lb 12.8 oz (93.4 kg)   SpO2 98%   BMI 31.29 kg/m²     Physical Exam:  CONSTITUTIONAL: awake, alert, cooperative, no apparent distress   EYES: pupils equal, round and reactive to light, sclera clear and conjunctiva normal  ENT: Normocephalic, without obvious abnormality, atraumatic  NECK: supple, symmetrical, no jugular venous distension and no carotid bruits   HEMATOLOGIC/LYMPHATIC: no cervical, supraclavicular or axillary lymphadenopathy   LUNGS: VBS, no wheezes, no crackles, no rhonchi, no increased work of breathing and clear to auscultation   CARDIOVASCULAR: regular rate and rhythm, normal S1 and S2, no murmur noted  ABDOMEN: normal bowel sounds x 4, soft, non-distended, non-tender, no masses palpated, no hepatosplenomegaly   MUSCULOSKELETAL: full range of motion noted, tone is normal  NEUROLOGIC: awake, alert, oriented to name, place and time. Motor skills grossly intact. SKIN: Normal skin color, texture, turgor and no jaundice.  appears intact   EXTREMITIES: no LE edema, no leg swelling, no cyanosis, no clubbing     Labs:  Hematology:  Lab Results   Component Value Date    WBC 7.8 10/20/2020    RBC 4.95 10/20/2020    HGB 17.4 10/20/2020    HCT 47.1 10/20/2020    MCV 95.2 10/20/2020    MCH 35.2 (H) 10/20/2020    MCHC 36.9 (H) 10/20/2020    RDW 14.2 10/20/2020     10/20/2020    MPV 9.4 10/20/2020    BANDSPCT 3 (L) 01/09/2015    SEGSPCT 68.3 (H) 10/20/2020    EOSRELPCT 1.7 10/20/2020    BASOPCT 2.2 (H) 10/20/2020    LYMPHOPCT 22.4 (L) 10/20/2020    MONOPCT 5.4 (H) 10/20/2020    BANDABS 0.25 01/09/2015    SEGSABS 5.4 10/20/2020    EOSABS 0.1 10/20/2020    BASOSABS 0.2 10/20/2020    LYMPHSABS 1.8 10/20/2020    MONOSABS 0.4 10/20/2020    DIFFTYPE AUTOMATED DIFFERENTIAL 10/20/2020    WBCMORP RARE 01/09/2015     No results found for: ESR  Chemistry:  Lab Results   Component Value Date     12/24/2020    K 4.0 12/24/2020     12/24/2020    CO2 31 12/24/2020    BUN 19 12/24/2020    CREATININE 1.1 12/24/2020    GLUCOSE 85 10/20/2020    CALCIUM 8.8 12/24/2020    PROT 6.5 12/24/2020    LABALBU 4.3 12/24/2020    BILITOT 0.6 12/24/2020    ALKPHOS 131 (H) 12/24/2020    AST 41 (H) 12/24/2020    ALT 40 12/24/2020    LABGLOM >60 12/24/2020    GFRAA >60 12/24/2020     Lab Results   Component Value Date     10/20/2020     No components found for: LD  Lab Results   Component Value Date    TSHHS 1.780 12/12/2019     Immunology:  Lab Results   Component Value Date    PROT 6.5 12/24/2020    ALBUMINELP 3.9 10/10/2017    LABALPH 0.2 10/10/2017    LABALPH 0.6 10/10/2017    LABBETA 1.0 10/10/2017    GAMGLOB 0.9 10/10/2017     No results found for: Alyson Ivory, BLUE  No results found for: B2M  Coagulation Panel:  Lab Results   Component Value Date    PROTIME 8.6 (L) 06/27/2013    INR 0.88 06/27/2013    APTT 30.0 06/27/2013    APTT  06/27/2013     CORRECTED ON 06/27 AT 0851: PREVIOUSLY REPORTED AS: 36.0     Anemia Panel:  No results found for: PTWXPEEI60, FOLATE  Tumor Markers:  Lab Results   Component Value Date    PSA 0.46 12/24/2020     Observations:  PHQ-9 Total Score: 0 (3/2/2021  1:06 PM)        Assessment & Plan:   JAK2 gene mutation positive Polycythemia vera    PLAN  Mr. Daniel Oh has been followed for JAK2  positive polycythemia vera. On March 2, 2021, he presented to me for followup. I have been following Mr. [facility] for JAK2  positive polycythemia vera and he has been on therapeutic phlebotomies as needed. He stated that his general sense of wellbeing is getting better with the phlebotomy.   He is tolerating the phlebotomy well and he does not encounter any major side effects from it. Since his hematocrit today was 44.6, he doesn't need phlebotomy this time. I will try to keep his hematocrit less than 45%. He is also on hydroxyurea 500 mg daily for his thrombocytosis and he is tolerating hydroxyurea well. Since his platelet count today was within normal range, I recommend him to continue with current dose of hydroxyurea for now. He is also on aspirin 162 mg daily and I recommend him to continue with that on regular basis to prevent JAK2 positive myeloproliferative neoplasm induced thromboembolic episodes. He doesn't have hepato-splenomegaly on physical examination. I will monitor his blood count every four month interval and will do phlebotomy as needed to keep his hematocrit less than 45%. I answered all his questions and concerns for today. I asked him to follow up with primary care physician on regular basis. Recent imaging and labs were reviewed and discussed with the patient.

## 2021-03-02 NOTE — PROGRESS NOTES
MA Rooming Questions  Patient: Sandy Osorio  MRN: [id number]    Date: 3/2/2021        1. Do you have any new issues?   no         2. Do you need any refills on medications?    no    3. Have you had any imaging done since your last visit?   no    4. Have you been hospitalized or seen in the emergency room since your last visit here?   no    5. Did the patient have a depression screening completed today?  yes    PHQ-9 Total Score: 0 (3/2/2021  1:06 PM)       PHQ-9 Given to (if applicable):               PHQ-9 Score (if applicable):                     [] Positive     []  Negative              Does question #9 need addressed (if applicable)                     [] Yes    []  No               Jose Cox MA

## 2021-03-30 ENCOUNTER — TELEPHONE (OUTPATIENT)
Dept: ONCOLOGY | Age: 61
End: 2021-03-30

## 2021-03-30 NOTE — TELEPHONE ENCOUNTER
Patient called asking who Dr Frankie Galeazzi would recommend for his problems with his veins in his left leg, please call

## 2021-03-30 NOTE — TELEPHONE ENCOUNTER
----- Message from April Elsi Mcdermott RN sent at 3/30/2021  2:49 PM EDT -----  Per Dr Boone Covert, please let pt know that Dr Soto Vidales or Dr Mark Matute are good options for this. Thank you.

## 2021-07-02 ENCOUNTER — HOSPITAL ENCOUNTER (OUTPATIENT)
Dept: INFUSION THERAPY | Age: 61
Discharge: HOME | End: 2021-07-02
Payer: COMMERCIAL

## 2021-07-02 DIAGNOSIS — D75.1 POLYCYTHEMIA, SECONDARY: ICD-10-CM

## 2021-07-02 LAB
ALBUMIN SERPL-MCNC: 4.6 GM/DL (ref 3.4–5)
ALP BLD-CCNC: 122 IU/L (ref 40–129)
ALT SERPL-CCNC: 22 U/L (ref 10–40)
ANION GAP SERPL CALCULATED.3IONS-SCNC: 11 MMOL/L (ref 4–16)
AST SERPL-CCNC: 20 IU/L (ref 15–37)
BASOPHILS ABSOLUTE: 0.1 K/CU MM
BASOPHILS RELATIVE PERCENT: 2.1 % (ref 0–1)
BILIRUB SERPL-MCNC: 0.7 MG/DL (ref 0–1)
BUN BLDV-MCNC: 17 MG/DL (ref 6–23)
CALCIUM SERPL-MCNC: 9.2 MG/DL (ref 8.3–10.6)
CHLORIDE BLD-SCNC: 97 MMOL/L (ref 99–110)
CO2: 28 MMOL/L (ref 21–32)
CREAT SERPL-MCNC: 1 MG/DL (ref 0.9–1.3)
DIFFERENTIAL TYPE: ABNORMAL
EOSINOPHILS ABSOLUTE: 0.1 K/CU MM
EOSINOPHILS RELATIVE PERCENT: 1.9 % (ref 0–3)
ERYTHROCYTE SEDIMENTATION RATE: 4 MM/HR (ref 0–20)
GFR AFRICAN AMERICAN: >60 ML/MIN/1.73M2
GFR NON-AFRICAN AMERICAN: >60 ML/MIN/1.73M2
GLUCOSE BLD-MCNC: 360 MG/DL (ref 70–99)
HCT VFR BLD CALC: 43.1 % (ref 42–52)
HEMOGLOBIN: 15.6 GM/DL (ref 13.5–18)
LACTATE DEHYDROGENASE: 172 IU/L (ref 120–246)
LYMPHOCYTES ABSOLUTE: 1.4 K/CU MM
LYMPHOCYTES RELATIVE PERCENT: 29.5 % (ref 24–44)
MCH RBC QN AUTO: 35.2 PG (ref 27–31)
MCHC RBC AUTO-ENTMCNC: 36.2 % (ref 32–36)
MCV RBC AUTO: 97.3 FL (ref 78–100)
MONOCYTES ABSOLUTE: 0.3 K/CU MM
MONOCYTES RELATIVE PERCENT: 5.6 % (ref 0–4)
PDW BLD-RTO: 13.8 % (ref 11.7–14.9)
PLATELET # BLD: 276 K/CU MM (ref 140–440)
PMV BLD AUTO: 9.3 FL (ref 7.5–11.1)
POTASSIUM SERPL-SCNC: 4.2 MMOL/L (ref 3.5–5.1)
RBC # BLD: 4.43 M/CU MM (ref 4.6–6.2)
SEGMENTED NEUTROPHILS ABSOLUTE COUNT: 2.9 K/CU MM
SEGMENTED NEUTROPHILS RELATIVE PERCENT: 60.9 % (ref 36–66)
SODIUM BLD-SCNC: 136 MMOL/L (ref 135–145)
TOTAL PROTEIN: 6.3 GM/DL (ref 6.4–8.2)
WBC # BLD: 4.8 K/CU MM (ref 4–10.5)

## 2021-07-02 PROCEDURE — 85652 RBC SED RATE AUTOMATED: CPT

## 2021-07-02 PROCEDURE — 80053 COMPREHEN METABOLIC PANEL: CPT

## 2021-07-02 PROCEDURE — 85025 COMPLETE CBC W/AUTO DIFF WBC: CPT

## 2021-07-02 PROCEDURE — 36415 COLL VENOUS BLD VENIPUNCTURE: CPT

## 2021-07-02 PROCEDURE — 83615 LACTATE (LD) (LDH) ENZYME: CPT

## 2021-07-06 RX ORDER — HYDROXYUREA 500 MG/1
CAPSULE ORAL
Qty: 90 CAPSULE | Refills: 3 | Status: SHIPPED | OUTPATIENT
Start: 2021-07-06 | End: 2021-11-23 | Stop reason: SDUPTHER

## 2021-07-07 RX ORDER — METOPROLOL SUCCINATE 25 MG/1
25 TABLET, EXTENDED RELEASE ORAL DAILY
Qty: 30 TABLET | Refills: 1 | Status: SHIPPED | OUTPATIENT
Start: 2021-07-07 | End: 2021-09-17

## 2021-08-17 PROBLEM — I83.812 VARICOSE VEINS OF LEFT LOWER EXTREMITY WITH PAIN: Status: ACTIVE | Noted: 2021-08-17

## 2021-08-17 PROBLEM — R60.0 LOCALIZED EDEMA: Status: ACTIVE | Noted: 2021-08-17

## 2021-10-31 NOTE — PROGRESS NOTES
Patient Name: Jennette Sandifer  Patient : 1960  Patient MRN: [id number]     Primary Oncologist: Mora Bo MD  Referring Provider: Brii Nguyne MD     Date of Service: 2021      Chief Complaint:   Chief Complaint   Patient presents with    Follow-up     Patient Active Problem List:     Polycythemia, secondary     Diabetes mellitus without complication ([facility] [address]    HPI:   Mr. Teddy Camacho ( [birth year]) was diagnosed with JAK2 gene mutation positive Polycythemia vera in 2013. He had an uncontrolled bleeding after tooth extraction. Found elevated counts. W/U including H/H elevated in the 21/60 range and BRIGHT-2 gene mutation positive 43.4 percent cells confirmed Dx of Polycythemia Vera. No evidence of bcr/abl 1 translocation by FISH. He was started on phlebotomies and felt much better after a couple of phlebotomies and his hemoglobin dropped down from 22 to 16 and hematocrit from 64 to 47. Platelet counts are hovering around 600- 700,000 per cubic millimeter. Thus he was also started on Hydrea in 2014. Trying to maintain Platelet counts close to normal range. Need for Phlebotomies has gone down to every 2-3 months    On 2021, he presented to me for followup. I have been following Mr. Teddy Camacho for JAK2  positive polycythemia vera and he has been on therapeutic phlebotomies as needed. He stated that his general sense of wellbeing is getting better with the phlebotomy. He is tolerating the phlebotomy well and he does not encounter any major side effects from it. Since his hematocrit today was 42.8, he doesn't need phlebotomy this time. I will try to keep his hematocrit less than 45%. He is also on hydroxyurea 500 mg daily for his thrombocytosis and he is tolerating hydroxyurea well. Since his platelet count today was within normal range, I recommend him to continue with current dose of hydroxyurea for now.     He is also on aspirin 162 mg daily and I recommend him to continue with that on regular basis to prevent JAK2 positive myeloproliferative neoplasm induced thromboembolic episodes. He doesn't have hepato-splenomegaly on physical examination. I will monitor his blood count every four month interval and will do phlebotomy as needed to keep his hematocrit less than 45%. He doesn't have any complaint on today visit. PAST MEDICAL HISTORY:  1. Hypertension since 2012, currently on lisinopril. 2. diabetes since , currently on Lantus insulin and Humalog. He was hospitalized for ketoacidosis in . 3. CKD, for which he is seeing Dr. Irma Napier. 4. His P Russ Velasquez was identified  in 2013.  5. Superficial phlebitis in  after he had laser surgery for varicose veins, especially on the left side. 6. History of migraines since .  7. He had left retinal hemorrhage ? detected by Dr Lorenza Mack when he went for his Glaucoma f/up in 2014 and was sent to Dr Diana Griffin and started on treatment  with Avastin for the same. His eye Sx have since improved. Mitch Duboisguido FAMILY HISTORY:  Father  at age 28 of lung cancer. Uncle Serenity Oden) had some form of blood disorder and  in . He had a ruptured spleen and cancer at age 68. He has one sister, Kiera Oliveira, and the other one, Anita Obando, is a Hospice nurse. PERSONAL HISTORY:  He is a nonsmoker, nondrinker. He lives with his wife, Enrique Zelaya, of 23 years. They have one daughter who is 23years old, studying at [employer]. 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 Oncology History    No history exists. Review of Systems: \"Per interval history; otherwise 10 point ROS is negative. \"  His energy level is stable, appetite and sleep are good. He doesn't have fever, chills, night sweats, cough, shortness of breath, chest pain, hemoptysis or palpitations.   His bowel and bladder functions are normal. He denies nausea, vomiting, abdominal pain, diarrhea, constipation, dysuria, loss of appetite or weight loss. He denies neuropathy and he doesn't have bleeding or clotting issues. He doesn't have any pain in his body. No anxiety or depression. The rest of the systems are unremarkable. Vital Signs:  BP (!) 163/74 (Site: Right Upper Arm, Position: Sitting, Cuff Size: Medium Adult)   Pulse 96   Temp 97 °F (36.1 °C) (Infrared)   Resp 12   Ht 5' 9\" (1.753 m)   Wt 204 lb (92.5 kg)   SpO2 98%   BMI 30.13 kg/m²     Physical Exam:  CONSTITUTIONAL: awake, alert, cooperative, no apparent distress   EYES: pupils equal, round and reactive to light, sclera clear and conjunctiva normal  ENT: Normocephalic, without obvious abnormality, atraumatic  NECK: supple, symmetrical, no jugular venous distension and no carotid bruits   HEMATOLOGIC/LYMPHATIC: no cervical, supraclavicular or axillary lymphadenopathy   LUNGS: VBS, no wheezes, clear to auscultation, no crackles, no rhonchi, no increased work of breathing,   CARDIOVASCULAR: regular rate and rhythm, normal S1 and S2, no murmur noted  ABDOMEN: normal bowel sounds x 4, soft, non-distended, non-tender, no masses palpated, no hepatosplenomegaly   MUSCULOSKELETAL: full range of motion noted, tone is normal  NEUROLOGIC: awake, alert, oriented to name, place and time. Motor skills grossly intact. SKIN: Normal skin color, texture, turgor and no jaundice.  appears intact   EXTREMITIES: no LE edema, no clubbing, no leg swelling, no cyanosis,     Labs:  Hematology:  Lab Results   Component Value Date    WBC 4.8 07/02/2021    RBC 4.43 (L) 07/02/2021    HGB 15.6 07/02/2021    HCT 43.1 07/02/2021    MCV 97.3 07/02/2021    MCH 35.2 (H) 07/02/2021    MCHC 36.2 (H) 07/02/2021    RDW 13.8 07/02/2021     07/02/2021    MPV 9.3 07/02/2021    BANDSPCT 3 (L) 01/09/2015    SEGSPCT 60.9 07/02/2021    EOSRELPCT 1.9 07/02/2021    BASOPCT 2.1 (H) 07/02/2021    LYMPHOPCT 29.5 07/02/2021    MONOPCT 5.6 (H) 07/02/2021    BANDABS 0.25 01/09/2015    SEGSABS 2.9 07/02/2021 phlebotomy. He is tolerating the phlebotomy well and he does not encounter any major side effects from it. Since his hematocrit today was 42.8, he doesn't need phlebotomy this time. I will try to keep his hematocrit less than 45%. He is also on hydroxyurea 500 mg daily for his thrombocytosis and he is tolerating hydroxyurea well. Since his platelet count today was within normal range, I recommend him to continue with current dose of hydroxyurea for now. He is also on aspirin 162 mg daily and I recommend him to continue with that on regular basis to prevent JAK2 positive myeloproliferative neoplasm induced thromboembolic episodes. He doesn't have hepato-splenomegaly on physical examination. I will monitor his blood count every four month interval and will do phlebotomy as needed to keep his hematocrit less than 45%. I answered all his questions and concerns for today. I asked him to follow up with primary care physician on regular basis. Recent imaging and labs were reviewed and discussed with the patient.

## 2021-11-02 ENCOUNTER — HOSPITAL ENCOUNTER (OUTPATIENT)
Dept: INFUSION THERAPY | Age: 61
Discharge: HOME | End: 2021-11-02
Payer: COMMERCIAL

## 2021-11-02 ENCOUNTER — OFFICE VISIT (OUTPATIENT)
Dept: ONCOLOGY | Age: 61
End: 2021-11-02
Payer: COMMERCIAL

## 2021-11-02 VITALS
DIASTOLIC BLOOD PRESSURE: 74 MMHG | BODY MASS INDEX: 30.21 KG/M2 | TEMPERATURE: 97 F | HEIGHT: 69 IN | OXYGEN SATURATION: 98 % | SYSTOLIC BLOOD PRESSURE: 163 MMHG | HEART RATE: 96 BPM | RESPIRATION RATE: 12 BRPM | WEIGHT: 204 LBS

## 2021-11-02 DIAGNOSIS — D75.1 POLYCYTHEMIA, SECONDARY: ICD-10-CM

## 2021-11-02 DIAGNOSIS — D75.1 POLYCYTHEMIA, SECONDARY: Primary | ICD-10-CM

## 2021-11-02 LAB
ALBUMIN SERPL-MCNC: 4.7 GM/DL (ref 3.4–5)
ALP BLD-CCNC: 142 IU/L (ref 40–129)
ALT SERPL-CCNC: 44 U/L (ref 10–40)
ANION GAP SERPL CALCULATED.3IONS-SCNC: 11 MMOL/L (ref 4–16)
AST SERPL-CCNC: 39 IU/L (ref 15–37)
BASOPHILS ABSOLUTE: 0.2 K/CU MM
BASOPHILS RELATIVE PERCENT: 2.5 % (ref 0–1)
BILIRUB SERPL-MCNC: 0.4 MG/DL (ref 0–1)
BUN BLDV-MCNC: 17 MG/DL (ref 6–23)
CALCIUM SERPL-MCNC: 9.7 MG/DL (ref 8.3–10.6)
CHLORIDE BLD-SCNC: 100 MMOL/L (ref 99–110)
CO2: 26 MMOL/L (ref 21–32)
CREAT SERPL-MCNC: 1 MG/DL (ref 0.9–1.3)
DIFFERENTIAL TYPE: ABNORMAL
EOSINOPHILS ABSOLUTE: 0.1 K/CU MM
EOSINOPHILS RELATIVE PERCENT: 1.9 % (ref 0–3)
ERYTHROCYTE SEDIMENTATION RATE: 17 MM/HR (ref 0–20)
GFR AFRICAN AMERICAN: >60 ML/MIN/1.73M2
GFR NON-AFRICAN AMERICAN: >60 ML/MIN/1.73M2
GLUCOSE BLD-MCNC: 226 MG/DL (ref 70–99)
HCT VFR BLD CALC: 42.8 % (ref 42–52)
HEMOGLOBIN: 15.8 GM/DL (ref 13.5–18)
LACTATE DEHYDROGENASE: 192 IU/L (ref 120–246)
LYMPHOCYTES ABSOLUTE: 1.3 K/CU MM
LYMPHOCYTES RELATIVE PERCENT: 22.6 % (ref 24–44)
MCH RBC QN AUTO: 35.6 PG (ref 27–31)
MCHC RBC AUTO-ENTMCNC: 36.9 % (ref 32–36)
MCV RBC AUTO: 96.4 FL (ref 78–100)
MONOCYTES ABSOLUTE: 0.3 K/CU MM
MONOCYTES RELATIVE PERCENT: 4.6 % (ref 0–4)
PDW BLD-RTO: 13.6 % (ref 11.7–14.9)
PLATELET # BLD: 344 K/CU MM (ref 140–440)
PMV BLD AUTO: 9.2 FL (ref 7.5–11.1)
POTASSIUM SERPL-SCNC: 4.8 MMOL/L (ref 3.5–5.1)
RBC # BLD: 4.44 M/CU MM (ref 4.6–6.2)
SEGMENTED NEUTROPHILS ABSOLUTE COUNT: 4.1 K/CU MM
SEGMENTED NEUTROPHILS RELATIVE PERCENT: 68.4 % (ref 36–66)
SODIUM BLD-SCNC: 137 MMOL/L (ref 135–145)
TOTAL PROTEIN: 6.9 GM/DL (ref 6.4–8.2)
WBC # BLD: 5.9 K/CU MM (ref 4–10.5)

## 2021-11-02 PROCEDURE — 99211 OFF/OP EST MAY X REQ PHY/QHP: CPT

## 2021-11-02 PROCEDURE — 36415 COLL VENOUS BLD VENIPUNCTURE: CPT

## 2021-11-02 PROCEDURE — 99213 OFFICE O/P EST LOW 20 MIN: CPT | Performed by: INTERNAL MEDICINE

## 2021-11-02 PROCEDURE — 85025 COMPLETE CBC W/AUTO DIFF WBC: CPT

## 2021-11-02 PROCEDURE — 85652 RBC SED RATE AUTOMATED: CPT

## 2021-11-02 PROCEDURE — 83615 LACTATE (LD) (LDH) ENZYME: CPT

## 2021-11-02 PROCEDURE — 80053 COMPREHEN METABOLIC PANEL: CPT

## 2021-11-02 ASSESSMENT — PATIENT HEALTH QUESTIONNAIRE - PHQ9
SUM OF ALL RESPONSES TO PHQ QUESTIONS 1-9: 0
2. FEELING DOWN, DEPRESSED OR HOPELESS: 0
SUM OF ALL RESPONSES TO PHQ9 QUESTIONS 1 & 2: 0
SUM OF ALL RESPONSES TO PHQ QUESTIONS 1-9: 0
SUM OF ALL RESPONSES TO PHQ QUESTIONS 1-9: 0
1. LITTLE INTEREST OR PLEASURE IN DOING THINGS: 0

## 2021-11-02 NOTE — PROGRESS NOTES
MA Rooming Questions  Patient: Arley Goodson  MRN: [id number]    Date: 11/2/2021        1. Do you have any new issues?   no         2. Do you need any refills on medications?    no    3. Have you had any imaging done since your last visit?   no    4. Have you been hospitalized or seen in the emergency room since your last visit here?   no    5. Did the patient have a depression screening completed today?  Yes    No data recorded     PHQ-9 Given to (if applicable):               PHQ-9 Score (if applicable):                     [] Positive     [x]  Negative              Does question #9 need addressed (if applicable)                     [] Yes    []  No               Reba Gonzalez MA

## 2021-11-23 ENCOUNTER — TELEPHONE (OUTPATIENT)
Dept: ONCOLOGY | Age: 61
End: 2021-11-23

## 2021-11-23 RX ORDER — HYDROXYUREA 500 MG/1
CAPSULE ORAL
Qty: 90 CAPSULE | Refills: 3 | Status: SHIPPED | OUTPATIENT
Start: 2021-11-23 | End: 2022-07-15 | Stop reason: SDUPTHER

## 2021-11-23 NOTE — TELEPHONE ENCOUNTER
Pt called in requesting refill on Hydrea and would like it sent to [address] [place] [address].     Medication refill sent to pharmacy

## 2021-12-10 RX ORDER — METOPROLOL SUCCINATE 25 MG/1
TABLET, EXTENDED RELEASE ORAL
Qty: 90 TABLET | Refills: 0 | Status: SHIPPED | OUTPATIENT
Start: 2021-12-10

## 2021-12-24 ENCOUNTER — HOSPITAL ENCOUNTER (OUTPATIENT)
Age: 61
Discharge: HOME | End: 2021-12-24
Payer: COMMERCIAL

## 2021-12-24 LAB
ALBUMIN SERPL-MCNC: 4.7 GM/DL (ref 3.4–5)
ALP BLD-CCNC: 130 IU/L (ref 40–129)
ALT SERPL-CCNC: 34 U/L (ref 10–40)
ANION GAP SERPL CALCULATED.3IONS-SCNC: 12 MMOL/L (ref 4–16)
AST SERPL-CCNC: 31 IU/L (ref 15–37)
BILIRUB SERPL-MCNC: 0.7 MG/DL (ref 0–1)
BUN BLDV-MCNC: 18 MG/DL (ref 6–23)
CALCIUM SERPL-MCNC: 9.4 MG/DL (ref 8.3–10.6)
CHLORIDE BLD-SCNC: 97 MMOL/L (ref 99–110)
CO2: 25 MMOL/L (ref 21–32)
CREAT SERPL-MCNC: 1.1 MG/DL (ref 0.9–1.3)
GFR AFRICAN AMERICAN: >60 ML/MIN/1.73M2
GFR NON-AFRICAN AMERICAN: >60 ML/MIN/1.73M2
GLUCOSE FASTING: 77 MG/DL (ref 70–99)
POTASSIUM SERPL-SCNC: 3.5 MMOL/L (ref 3.5–5.1)
SODIUM BLD-SCNC: 134 MMOL/L (ref 135–145)
TOTAL PROTEIN: 7 GM/DL (ref 6.4–8.2)
TSH HIGH SENSITIVITY: 1.78 UIU/ML (ref 0.27–4.2)

## 2021-12-24 PROCEDURE — 36415 COLL VENOUS BLD VENIPUNCTURE: CPT

## 2021-12-24 PROCEDURE — 83036 HEMOGLOBIN GLYCOSYLATED A1C: CPT

## 2021-12-24 PROCEDURE — 80061 LIPID PANEL: CPT

## 2021-12-24 PROCEDURE — G0103 PSA SCREENING: HCPCS

## 2021-12-24 PROCEDURE — 82043 UR ALBUMIN QUANTITATIVE: CPT

## 2021-12-24 PROCEDURE — 82570 ASSAY OF URINE CREATININE: CPT

## 2021-12-24 PROCEDURE — 84443 ASSAY THYROID STIM HORMONE: CPT

## 2021-12-24 PROCEDURE — 80053 COMPREHEN METABOLIC PANEL: CPT

## 2021-12-25 LAB
CHOLESTEROL, FASTING: 164 MG/DL
CREATININE URINE: 72.6 MG/DL (ref 39–259)
ESTIMATED AVERAGE GLUCOSE: 194 MG/DL
HBA1C MFR BLD: 8.4 % (ref 4.2–6.3)
HDLC SERPL-MCNC: 46 MG/DL
LDL CHOLESTEROL DIRECT: 105 MG/DL
MICROALBUMIN/CREAT 24H UR: 4.6 MG/DL
MICROALBUMIN/CREAT UR-RTO: 63.4 MG/G CREAT (ref 0–30)
PROSTATE SPECIFIC ANTIGEN: 0.42 NG/ML (ref 0–4)
TRIGLYCERIDE, FASTING: 65 MG/DL

## 2022-01-03 ENCOUNTER — HOSPITAL ENCOUNTER (EMERGENCY)
Age: 62
Discharge: HOME | End: 2022-01-03
Attending: EMERGENCY MEDICINE
Payer: COMMERCIAL

## 2022-01-03 ENCOUNTER — APPOINTMENT (OUTPATIENT)
Dept: GENERAL RADIOLOGY | Age: 62
End: 2022-01-03
Payer: COMMERCIAL

## 2022-01-03 VITALS
RESPIRATION RATE: 18 BRPM | BODY MASS INDEX: 29.62 KG/M2 | WEIGHT: 200 LBS | SYSTOLIC BLOOD PRESSURE: 162 MMHG | OXYGEN SATURATION: 97 % | HEART RATE: 80 BPM | DIASTOLIC BLOOD PRESSURE: 79 MMHG | HEIGHT: 69 IN

## 2022-01-03 DIAGNOSIS — S93.492A SPRAIN OF ANTERIOR TALOFIBULAR LIGAMENT OF LEFT ANKLE, INITIAL ENCOUNTER: Primary | ICD-10-CM

## 2022-01-03 PROCEDURE — 99282 EMERGENCY DEPT VISIT SF MDM: CPT

## 2022-01-03 PROCEDURE — 73610 X-RAY EXAM OF ANKLE: CPT

## 2022-01-03 RX ORDER — IBUPROFEN 400 MG/1
400 TABLET ORAL ONCE
Status: DISCONTINUED | OUTPATIENT
Start: 2022-01-03 | End: 2022-01-03 | Stop reason: HOSPADM

## 2022-01-03 ASSESSMENT — PAIN DESCRIPTION - DESCRIPTORS: DESCRIPTORS: ACHING;SHARP

## 2022-01-03 ASSESSMENT — PAIN DESCRIPTION - PAIN TYPE: TYPE: ACUTE PAIN

## 2022-01-03 ASSESSMENT — PAIN DESCRIPTION - ORIENTATION: ORIENTATION: LEFT

## 2022-01-03 ASSESSMENT — PAIN DESCRIPTION - ONSET: ONSET: GRADUAL

## 2022-01-03 ASSESSMENT — PAIN DESCRIPTION - LOCATION: LOCATION: ANKLE

## 2022-01-03 ASSESSMENT — PAIN SCALES - GENERAL: PAINLEVEL_OUTOF10: 5

## 2022-01-03 ASSESSMENT — PAIN DESCRIPTION - FREQUENCY: FREQUENCY: CONTINUOUS

## 2022-01-03 ASSESSMENT — PAIN DESCRIPTION - PROGRESSION: CLINICAL_PROGRESSION: GRADUALLY WORSENING

## 2022-01-03 NOTE — ED PROVIDER NOTES
Triage Chief Complaint:   Ankle Pain (L ankle Pain)    Yocha Dehe:  Elvis Deleon is a 62 y.o. male that presents to the ED with an inversion injury to his left ankle. Happened just prior to [facility] carrying a ladder when he tripped and fell. Significant swelling is limping into the triage room. He cannot fully bear weight. No previous injury or surgery. Pain is moderate throbbing constant. Past Medical History:   Diagnosis Date    Blood clot in vein     Diabetes mellitus ([address]     Hypertension     Migraine     Polycythemia vera(238.4)      Past Surgical History:   Procedure Laterality Date    VARICOSE VEIN SURGERY      WISDOM TOOTH EXTRACTION      06/2013     No family history on file. Social History     Socioeconomic History    Marital status:      Spouse name: Not on file    Number of children: Not on file    Years of education: Not on file    Highest education level: Not on file   Occupational History    Not on file   Tobacco Use    Smoking status: Never Smoker    Smokeless tobacco: Never Used   Substance and Sexual Activity    Alcohol use: Not on file    Drug use: Not on file    Sexual activity: Not on file   Other Topics Concern    Not on file   Social History Narrative    Not on file     Social Determinants of Health     Financial Resource Strain:     Difficulty of Paying Living Expenses: Not on file   Food Insecurity:     Worried About Running Out of Food in the Last Year: Not on file    Stephanie of Food in the Last Year: Not on file   Transportation Needs:     Lack of Transportation (Medical): Not on file    Lack of Transportation (Non-Medical):  Not on file   Physical Activity:     Days of Exercise per Week: Not on file    Minutes of Exercise per Session: Not on file   Stress:     Feeling of Stress : Not on file   Social Connections:     Frequency of Communication with Friends and Family: Not on file    Frequency of Social Gatherings with Friends and Family: Not on file    Attends Tenriism Services: Not on file    Active Member of Clubs or Organizations: Not on file    Attends Club or Organization Meetings: Not on file    Marital Status: Not on file   Intimate Partner Violence:     Fear of Current or Ex-Partner: Not on file    Emotionally Abused: Not on file    Physically Abused: Not on file    Sexually Abused: Not on file   Housing Stability:     Unable to Pay for Housing in the Last Year: Not on file    Number of [place] in the Last Year: Not on file    Unstable Housing in the Last Year: Not on file     No current facility-administered medications for this encounter. Current Outpatient Medications   Medication Sig Dispense Refill    metoprolol succinate (TOPROL XL) 25 MG extended release tablet TAKE 1 TABLET DAILY 90 tablet 0    hydroxyurea (HYDREA) 500 MG chemo capsule TAKE 1 CAPSULE DAILY 90 capsule 3    SUMAtriptan (IMITREX) 100 MG tablet Take 100 mg by mouth once as needed.  aspirin EC 81 MG EC tablet Take 162 mg by mouth daily.  insulin regular (HUMULIN R;NOVOLIN R) 100 UNIT/ML injection Inject  into the skin See Admin Instructions.  insulin glargine (LANTUS) 100 UNIT/ML injection Inject  into the skin nightly. Allergies   Allergen Reactions    Pcn [Penicillins]     Sulfa Antibiotics          ROS:    Review of Systems   Musculoskeletal: Positive for gait problem and joint swelling. All other systems reviewed and are negative. Nursing Notes Reviewed    Physical Exam:      ED Triage Vitals [01/03/22 1005]   Enc Vitals Group      BP (!) 162/79      Pulse 80      Resp 18      Temp       Temp src       SpO2 97 %      Weight 200 lb (90.7 kg)      Height 5' 9\" (1.753 m)      Head Circumference       Peak Flow       Pain Score       Pain Loc       Pain Edu? Excl. in [address]? Physical Exam  Vitals and nursing note reviewed. Exam conducted with a chaperone present.    Constitutional:       Appearance: He is well-developed. HENT:      Head: Normocephalic and atraumatic. Eyes:      Pupils: Pupils are equal, round, and reactive to light. Musculoskeletal:      Cervical back: Normal range of motion and neck supple. Left ankle: Swelling present. No ecchymosis. Tenderness present over the ATF ligament and AITF ligament. No lateral malleolus tenderness. Decreased range of motion. Left Achilles Tendon: Normal.   Skin:     General: Skin is warm and dry. Neurological:      Mental Status: He is alert and oriented to person, place, and time. Psychiatric:         Mood and Affect: Mood normal.         I have reviewed and interpreted all of the currently available lab results from this visit (ifapplicable):  No results found for this visit on 01/03/22. Radiographs (if obtained):  [] The following radiograph wasinterpreted by myself in the absence of a radiologist:   [] Radiologist's Report Reviewed:  XR ANKLE LEFT (MIN 3 VIEWS)   Final Result   No acute bony abnormality identified in the left ankle. Lateral and anterior ankle soft tissue swelling               EKG (if obtained): (All EKG's are interpreted by myself in the absence of a cardiologist)    Chart review shows recent radiographs:  XR ANKLE LEFT (MIN 3 VIEWS)    Result Date: 1/3/2022  EXAMINATION: THREE XRAY VIEWS OF THE LEFT ANKLE 1/3/2022 9:17 am COMPARISON: None. HISTORY: ORDERING SYSTEM PROVIDED HISTORY: injury TECHNOLOGIST PROVIDED HISTORY: Reason for exam:->injury Reason for Exam: injury Additional signs and symptoms: lt lateral pain, swelling, and bruising after falling this morning FINDINGS: No acute fracture is seen. No evidence of dislocation. The ankle mortise is congruent. There is soft tissue swelling at the lateral and anterior aspects of the ankle. No acute bony abnormality identified in the left ankle.  Lateral and anterior ankle soft tissue swelling       MDM:      Patient is an inversion injury significant lateral soft tissue swelling is unable to bear weight will be given an ace splint and a set of crutches. He will follow-up with your counselor. Cannot function based on his injury    Please note that portions of this note may have been completed with a voice recognition/dictation program. Efforts were made to edit the dictations but occasionally words are mis-transcribed.      All pertinent Lab data and radiographic results reviewed with patient at bedside. The patient and/or the family were informed of the results of any tests/labs/imaging, the treatment plan, and time was allotted to answer questions. See chart for details of medications given during the ED stay.     The likelihood of other entities in the differential is insufficient to justify any further testing for them. This was explained to the patient. The patient was advised that persistent or worsening symptoms would require further evaluation.                Clinical Impression:  1. Sprain of anterior talofibular ligament of left ankle, initial encounter      Disposition referral (if applicable):  MD Nan Plata [address] [id number] [phone]    Schedule an appointment as soon as possible for a visit   If symptoms worsen    Disposition medications (if applicable):  Discharge Medication List as of 1/3/2022 10:18 AM              Olive Haywood DO, FACEP      Comment: Please note this report has been produced using speech recognition software and maycontain errors related to that system including errors in grammar, punctuation, and spelling, as well as words and phrases that may be inappropriate. If there are any questions or concerns please feel free to contact thedictating provider for clarification.         Jamee Madrid DO  01/03/22 6798

## 2022-01-03 NOTE — PROGRESS NOTES
Applied air cast and instructed on crutches. Pt able to use well in the room. Pt discharged with education.

## 2022-01-12 ENCOUNTER — HOSPITAL ENCOUNTER (OUTPATIENT)
Dept: MRI IMAGING | Age: 62
Discharge: HOME | End: 2022-01-12

## 2022-01-12 DIAGNOSIS — S96.912A STRAIN OF LEFT ANKLE, INITIAL ENCOUNTER: ICD-10-CM

## 2022-01-12 DIAGNOSIS — S93.402A SPRAIN OF LEFT ANKLE, UNSPECIFIED LIGAMENT, INITIAL ENCOUNTER: ICD-10-CM

## 2022-01-12 PROCEDURE — 73721 MRI JNT OF LWR EXTRE W/O DYE: CPT

## 2022-01-25 ENCOUNTER — TELEPHONE (OUTPATIENT)
Dept: ORTHOPEDIC SURGERY | Age: 62
End: 2022-01-25

## 2022-01-25 ENCOUNTER — OFFICE VISIT (OUTPATIENT)
Dept: ORTHOPEDIC SURGERY | Age: 62
End: 2022-01-25
Payer: COMMERCIAL

## 2022-01-25 VITALS
BODY MASS INDEX: 29.62 KG/M2 | HEART RATE: 101 BPM | RESPIRATION RATE: 19 BRPM | HEIGHT: 69 IN | OXYGEN SATURATION: 97 % | WEIGHT: 200 LBS

## 2022-01-25 DIAGNOSIS — S93.492A SPRAIN OF ANTERIOR TALOFIBULAR LIGAMENT OF LEFT ANKLE, INITIAL ENCOUNTER: Primary | ICD-10-CM

## 2022-01-25 PROCEDURE — 99203 OFFICE O/P NEW LOW 30 MIN: CPT | Performed by: ORTHOPAEDIC SURGERY

## 2022-01-25 ASSESSMENT — ENCOUNTER SYMPTOMS
COLOR CHANGE: 0
CHEST TIGHTNESS: 0
VOMITING: 0
EYE PAIN: 0
SHORTNESS OF BREATH: 0
EYE REDNESS: 0
WHEEZING: 0

## 2022-01-25 NOTE — TELEPHONE ENCOUNTER
BWC        Continue weight-bearing as tolerated. Continue range of motion exercises as instructed. Ice and elevate as needed. Tylenol or Motrin for pain. Continue light duty, sit down duty at work. Follow up in 1 month. Ankle brace fitted today. Physical therapy requested today.

## 2022-01-25 NOTE — PATIENT INSTRUCTIONS
Continue weight-bearing as tolerated. Continue range of motion exercises as instructed. Ice and elevate as needed. Tylenol or Motrin for pain. Continue light duty, sit down duty at work. Follow up in 1 month. Ankle brace fitted today. Physical therapy requested today.

## 2022-01-25 NOTE — PROGRESS NOTES
Patient presents to the office with a [facility] claim of a sprain of the left ankle, DOI: 01/03/2022. Pt stated he injured his ankle walking down the stairs at work while holding a ladder and missed a step and fell twisting his foot. Pt stated he has been wearing a boot since the initial injury and has transitioned to a brace at home. Pt stated that his pain has been progressively decreasing and rates it a 1/10. Pt has been taking advil, icing, and elevating with relief. Pt has remained off work since the injury.

## 2022-01-25 NOTE — PROGRESS NOTES
1/25/2022   Chief Complaint   Patient presents with    Ankle Injury     Left ankle [facility] DOI 1/3/22        History of Present Illness:                             Scherry Primrose is a 62 y.o. male who presents today for evaluation of his left ankle injury. He was walking down some stairs carrying a ladder when he had a twisting inversion injury to the left ankle. He had immediate pain and swelling and inability to bear weight after the injury. He was seen at the emergency room and x-rays were negative for fracture. He was also seen by occupational health and was given a fracture boot. Has been able to increase weightbearing with the boot and working on improving his range of motion. He continues to have pain swelling and stiffness at the ankle with most significant discomfort along the lateral aspect. No history of previous injuries to the ankle. He denies any further or ongoing instability events. Pain has been better with rest and elevation. He has been doing well in his boot and is interested in transitioning to an ankle brace. An MRI was obtained due to the severity of his pain and swelling after his injury. He was then referred here for further evaluation. Medical History  Patient's medications, allergies, past medical, surgical, social and family histories were reviewed and updated as appropriate. Past Medical History:   Diagnosis Date    Blood clot in vein     Diabetes mellitus ([address]     Hypertension     Migraine     Polycythemia vera(238.4)      Past Surgical History:   Procedure Laterality Date    VARICOSE VEIN SURGERY      WISDOM TOOTH EXTRACTION      06/2013     No family history on file.   Social History     Socioeconomic History    Marital status:      Spouse name: None    Number of children: None    Years of education: None    Highest education level: None   Occupational History    None   Tobacco Use    Smoking status: Never Smoker    Smokeless Instructions.  insulin glargine (LANTUS) 100 UNIT/ML injection Inject  into the skin nightly. No current facility-administered medications for this visit. Allergies   Allergen Reactions    Pcn [Penicillins]     Sulfa Antibiotics          Review of Systems   Constitutional: Negative for chills and fever. HENT: Negative for congestion and sneezing. Eyes: Negative for pain and redness. Respiratory: Negative for chest tightness, shortness of breath and wheezing. Cardiovascular: Negative for chest pain and palpitations. Gastrointestinal: Negative for vomiting. Musculoskeletal: Positive for arthralgias. Skin: Negative for color change and rash. Neurological: Negative for weakness and numbness. Psychiatric/Behavioral: Negative for agitation. The patient is not nervous/anxious. Examination:  General Exam:  Vitals: Pulse 101   Resp 19   Ht 5' 9\" (1.753 m)   Wt 200 lb (90.7 kg)   SpO2 97%   BMI 29.53 kg/m²    Physical Exam  Vitals and nursing note reviewed. Constitutional:       Appearance: Normal appearance. HENT:      Head: Normocephalic and atraumatic. Eyes:      Conjunctiva/sclera: Conjunctivae normal.      Pupils: Pupils are equal, round, and reactive to light. Pulmonary:      Effort: Pulmonary effort is normal.   Musculoskeletal:      Cervical back: Normal range of motion. Left knee: No swelling, effusion or ecchymosis. Normal range of motion. No tenderness. Right ankle: No swelling, deformity, ecchymosis or lacerations. No tenderness. Normal range of motion. Normal pulse. Right Achilles Tendon: Normal.      Comments: Left lower extremity:  There is mild to moderate swelling throughout the ankle most severe along the lateral aspect adjacent to the distal fibula and lateral hindfoot. There is resolving ecchymosis present at the lateral ankle inferior to the malleolus and anterior to the malleolus.   No tenderness to palpation at the medial ankle or calcaneus or Achilles tendon. Range of motion is moderately restricted in dorsiflexion and plantarflexion but pain elicited mid arc of motion. Range of motion is severely restricted with inversion and eversion with pain referred to the lateral aspect of the ankle along the anterior talofibular ligament. Normal alignment of the ankle joint. No gross instability with stress examination however this is limited due to pain and guarding. Sensation and motor function is intact throughout the foot and ankle. Strength is 5 out of 5 with ankle dorsiflexion and plantarflexion and flexion and extension of the toes. Skin is intact no open wounds or lesions. 2+ DP pulse and brisk cap refill present. Skin:     General: Skin is warm and dry. Neurological:      Mental Status: He is alert and oriented to person, place, and time. Psychiatric:         Mood and Affect: Mood normal.         Behavior: Behavior normal.            Diagnostic testing:  X-ray images were reviewed by myself and discussed with the patient:  FINDINGS:   No acute fracture is seen.  No evidence of dislocation.  The ankle mortise is   congruent.  There is soft tissue swelling at the lateral and anterior aspects   of the ankle.           Impression   No acute bony abnormality identified in the left ankle.       Lateral and anterior ankle soft tissue swelling         MRI images of the left ankle were reviewed by myself and discussed with the patient:  FINDINGS:   SYNDESMOTIC LIGAMENTS:  The anterior-inferior tibiofibular ligament,   interosseous membrane and posterior-inferior tibiofibular ligaments are   normal.       LATERAL COLLATERAL LIGAMENT COMPLEX: Diminutive appearance of the anterior   talofibular ligament with periligamentous edema may represent partial   tearing.  There is intermediate signal within the posterior talofibular   ligament.  The calcaneofibular ligament is grossly intact.     DELTOID LIGAMENT COMPLEX: Intermediate signal of the deep fibers with   periligamentous edema.  There is marrow edema at the proximal attachment,   likely reactive.       SINUS TARSI AND SPRING LIGAMENT: Normal fat signal within the sinus tarsi is   seen.  The spring ligament appears intact.       MEDIAL TENDONS: Fluid is seen within the tendon sheaths of the posterior   tibialis, flexor digitorum, and flexor hallucis tendons.  No discrete tendon   tear is identified.       LATERAL TENDONS:  The peroneus longus and brevis tendons are intact.       ANTERIOR TENDONS: The tibialis anterior, extensor hallucis longus and   extensor digitorum longus tendons are normal in position, morphology and   signal.       ACHILLES TENDON: There is a trace amount of pre Achilles bursal fluid.  No   discrete tear of the Achilles tendon is identified.       PLANTAR FASCIA:  The medial and lateral bundles of the plantar fascia are   normal in morphology and signal. There is no evidence of acute plantar   fasciitis or tear.  No evidence of plantar fascial nodules.       TARSAL TUNNEL: There are no obstructing lesions within the tarsal tunnel.       BONE MARROW: Mild marrow edema is seen within the posterolateral talus, and   medial malleolus.  No discrete fracture line.       JOINT SPACES: Moderate-sized tibiotalar and posterior subtalar joint   effusions.  No discrete full-thickness cartilage defect definitively   visualized.  Dorsal and lateral predominant subcutaneous edema, nonspecific.           Impression   Partial tearing of the anterior talofibular ligament.       Marrow edema within the posterior talus may represent an osseous contusion.       Low-grade sprain of the posterior talofibular ligament and deltoid ligament.       Tenosynovitis of the posterior tibialis, flexor digitorum, and flexor   hallucis longus.       Small tibiotalar and subtalar effusions, likely reactive.             Office Procedures:  No orders of the defined types were placed in this encounter. Assessment and Plan  1. Left ankle sprain anterior talofibular ligament    We reviewed the MRI findings and have reassured the patient that he has partial tear of the anterior talofibular ligament is consistent with a severe sprain of his ankle. On exam it appears that his ankles are healing appropriately well. I recommend he continue with nonoperative management of this injury. I have ordered physical therapy to advance range of motion and strengthening as pain allows. I provide him with an ankle ASO brace so that he may transition and wean out of the boot as pain allows. Continue with elevation and anti-inflammatory medications and ice. I recommended that he remain on light duty restrictions of sitdown work only for the next 4 weeks. Follow-up here in 4 weeks for check of his progress with therapy and we will discuss advancing his activities at work.     Electronically signed by David Zafar MD on 1/25/2022 at 1:23 PM

## 2022-02-02 LAB
ABSOLUTE IMMATURE GRANULOCYTE: 0 K/UL (ref 0–0.1)
ALBUMIN/GLOBULIN RATIO: 2 RATIO (ref 0.8–2.6)
ALBUMIN: 4.3 G/DL (ref 3.5–5.2)
ALP BLD-CCNC: 158 U/L (ref 23–144)
ALT SERPL-CCNC: 29 U/L (ref 0–60)
AST SERPL-CCNC: 23 U/L (ref 0–55)
BASOPHILS ABSOLUTE: 0.1 K/UL (ref 0–0.3)
BASOPHILS RELATIVE PERCENT: 1 % (ref 0–2)
BILIRUB SERPL-MCNC: 0.6 MG/DL (ref 0–1.2)
BUN BLDV-MCNC: 15 MG/DL (ref 3–29)
BUN/CREAT BLD: 14 (ref 7–25)
CALCIUM SERPL-MCNC: 9.2 MG/DL (ref 8.5–10.5)
CHLORIDE BLD-SCNC: 99 MEQ/L (ref 96–110)
CO2: 29 MEQ/L (ref 19–32)
CREAT SERPL-MCNC: 1.1 MG/DL (ref 0.5–1.4)
DIFFERENTIAL TYPE: ABNORMAL
EOSINOPHILS ABSOLUTE: 0.1 K/UL (ref 0–0.5)
EOSINOPHILS RELATIVE PERCENT: 1.8 % (ref 0–5)
ERYTHROCYTE SEDIMENTATION RATE: 4 MM/HR (ref 0–20)
GLOBULIN: 2.1 G/DL (ref 1.9–3.6)
GLOMERULAR FILTRATION RATE: 73 MLS/MIN/1.73M2
GLUCOSE BLD-MCNC: 338 MG/DL (ref 70–99)
HCT VFR BLD CALC: 45.2 % (ref 37.5–51)
HEMOGLOBIN: 16.3 G/DL (ref 13–17.7)
IMMATURE GRANULOCYTES: 0.4 %
LDH: 171 U/L (ref 0–260)
LYMPHOCYTES ABSOLUTE: 1.4 K/UL (ref 0.9–4.1)
LYMPHOCYTES RELATIVE PERCENT: 27.1 % (ref 14–51)
MCH RBC QN AUTO: 35.1 PG (ref 26–34)
MCHC RBC AUTO-ENTMCNC: 36.1 G/DL (ref 30.7–35.5)
MCV RBC AUTO: 97.2 FL (ref 80–100)
MONOCYTES ABSOLUTE: 0.2 K/UL (ref 0.2–1)
MONOCYTES RELATIVE PERCENT: 4.8 % (ref 4–12)
NEUTROPHILS ABSOLUTE: 3.3 K/UL (ref 1.8–7.5)
NEUTROPHILS RELATIVE PERCENT: 64.9 % (ref 42–80)
NUCLEATED RBCS: 0 /100 WBC
PDW BLD-RTO: 13.4 %
PLATELET # BLD: 359 K/UL (ref 140–400)
PMV BLD AUTO: 9.7 FL (ref 7.2–11.7)
POTASSIUM SERPL-SCNC: 4.4 MEQ/L (ref 3.4–5.3)
RBC # BLD: 4.65 M/UL (ref 4.14–5.8)
SODIUM BLD-SCNC: 137 MEQ/L (ref 135–148)
STATUS: ABNORMAL
TOTAL PROTEIN: 6.4 G/DL (ref 6–8.3)
WBC: 5 K/UL (ref 3.5–10.9)

## 2022-02-03 ENCOUNTER — CLINICAL DOCUMENTATION (OUTPATIENT)
Dept: ONCOLOGY | Age: 62
End: 2022-02-03

## 2022-02-03 DIAGNOSIS — D75.1 POLYCYTHEMIA, SECONDARY: Primary | ICD-10-CM

## 2022-02-08 ENCOUNTER — TELEPHONE (OUTPATIENT)
Dept: ONCOLOGY | Age: 62
End: 2022-02-08

## 2022-02-08 NOTE — TELEPHONE ENCOUNTER
Patient is schedule for a phlebotomy however he had an ankle injury and wanted to make sure it is still okay to get the phlebotomy with the ankle injury.

## 2022-02-09 ENCOUNTER — HOSPITAL ENCOUNTER (OUTPATIENT)
Dept: PHYSICAL THERAPY | Age: 62
Setting detail: THERAPIES SERIES
Discharge: HOME | End: 2022-02-09
Payer: COMMERCIAL

## 2022-02-09 PROCEDURE — 97161 PT EVAL LOW COMPLEX 20 MIN: CPT

## 2022-02-09 PROCEDURE — 97016 VASOPNEUMATIC DEVICE THERAPY: CPT

## 2022-02-09 PROCEDURE — 97110 THERAPEUTIC EXERCISES: CPT

## 2022-02-09 ASSESSMENT — PAIN SCALES - GENERAL: PAINLEVEL_OUTOF10: 3

## 2022-02-09 ASSESSMENT — PAIN DESCRIPTION - PAIN TYPE: TYPE: ACUTE PAIN

## 2022-02-09 ASSESSMENT — PAIN DESCRIPTION - FREQUENCY: FREQUENCY: INTERMITTENT

## 2022-02-09 ASSESSMENT — PAIN DESCRIPTION - PROGRESSION: CLINICAL_PROGRESSION: GRADUALLY IMPROVING

## 2022-02-09 NOTE — FLOWSHEET NOTE
Outpatient Physical Therapy  [place]           [] Phone: [phone]   Fax: [phone]  [facility]           [x] Phone: [phone]   Fax: [phone]        Physical Therapy Daily Treatment Note  Date:  2022    Patient Name:  Nikki Fowler    :  1960  MRN: [id number]  Restrictions/Precautions:    Diagnosis:   Diagnosis: L ankle  sprain  Date of Injury/Surgery:   Treatment Diagnosis: Treatment Diagnosis: L ankle pain    Insurance/Certification information: PT Insurance Information: BWC 2 x/wk x 6 weeks till 3/27/22   Referring Physician:  Referring Practitioner: Jourdan Hanson  Next Doctor Visit:    Plan of care signed (Y/N):    Outcome Measure: TUG 16 sec/LEFS   Visit# / total visits:    Pain level: 310 L ankle  Goals:     Patient goals : regain PLOF     Long term goals  Time Frame for Long term goals : 6 weeks 3/27/22  Long term goal 1: patient will return to work wihout restrictions      ASSESSMENT  Patient primary complaints:  L ankle pain    History of condition: L ankle  sprain(inversion) 1/3/22 @ work - 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 then crutches x 1 week - now in  boot, currently off work  Current functional limitations: off work -unable to accommodate work restrictions; wearing walking boot  Clinical findings:TUG 16 sec/LEFS ; L ankle swollen  PLOF:Pt was independent with ADLs/IADLs without significant pain or difficulties prior to ankle injury  Skilled PT interventions are intended to:  Decrease pain and restore mobility which will enable patient  to return to PLOF  Patient agrees with established plan of care and assisted in the development of their  goals  Barriers to learning:none- no mental/cognitive barriers observed  Preferred learning style(s):   written- demonstration -practice  Preferred Language: English  Potential barriers to progress:none  The patient appears motivated to participate in PT and regain PLOF: yes      Subjective:  See eval         Any changes in Ambulatory Summary Sheet? None        Objective:  See eval   COVID screening questions were asked and patient attested that there had been no contact or symptoms        Exercises: (No more than 4 columns)   Exercise/Equipment Date 2/9/22 Date Date           WARM UP                     TABLE      Ankle AROM DF/PF     Towel stretch 15 ct x3     alphabet 1 set     Ball roll on arch 3'        [place]  3'     STANDING      On aeromat  Cane raises overhead./ side to side      EO/ EC x 30 sec     High marches 3 laps                                  PROPRIOCEPTION                                    MODALITIES                      Other Therapeutic Activities/Education:        Home Exercise Program:  Ankle AROM/ towel stretch/ alphabet/ ball roll - patient expressed understanding - provided with handout      Manual Treatments:        Modalities:        Communication with other providers:        Assessment:  (Response towards treatment session) (Pain Rating)    Pt tolerated  treatment without any adverse reactions or complications this date.  . Pt would continue to benefit from skilled therapy interventions to address remaining impairments, improve mobility and strength,  and progress toward goal completion and prepare for d/c including finalizing HEP   Pain complaints after session 0/10    Plan for Next Session:        Time In / Time Out:           If BWC Please Indicate Time In/Out/Total Time  CPT Code Time in Time out Total Time   PT eval   1045  1102  17   147363  1102  1125  23   VASO 22033   1125  1140  15                                 Total for session             Timed Code/Total Treatment Minutes:        Next Progress Note due:        Plan of Care Interventions:  [x] Therapeutic Exercise  [] Modalities:  [x] Therapeutic Activity     [] Ultrasound  [] Estim  [x] Gait Training      [] Cervical Traction [] Lumbar Traction  [x] Neuromuscular Re-education    [] Cold/hotpack [] Iontophoresis   [x] Instruction in HEP      [x] Vasopneumatic   [] Dry Needling    [x] Manual Therapy               [] Aquatic Therapy              Electronically signed by:  Lila Van PT, 2/9/2022, 1:08 PM

## 2022-02-09 NOTE — PLAN OF CARE
Outpatient Physical Therapy           [place]           [] Phone: [phone]   Fax: [phone]  [facility]           [x] Phone: [phone]   Fax: [phone]     To: Referring Practitioner: Misa Tyler    From: Blanca Mendenhall PT, PT     Patient: Jennifer Gonzalez       : 1960  Diagnosis: Diagnosis: L ankle  sprain   Treatment Diagnosis: Treatment Diagnosis: L ankle pain   Date: 2022    Physical Therapy Certification/Re-Certification Form    The following patient has been evaluated for physical therapy services and for therapy to continue, insurance requires physician review of the treatment plan initially and every 90 days. Please review the attached evaluation and/or summary of the patient's plan of care, and verify that you agree therapy should continue by signing the attached document and sending it back to our office.     Assessment:    Assessment: TUG 16 sec/LEFS     Plan of Care/Treatment to date:  [x] Therapeutic Exercise  [] Modalities:  [x] Therapeutic Activity     [] Ultrasound  [] Electrical Stimulation  [x] Gait Training      [] Cervical Traction [] Lumbar Traction  [x] Neuromuscular Re-education    [] Cold/hotpack [] Iontophoresis   [x] Instruction in HEP      [x] Vasopneumatic    [] Dry Needling  [x] Manual Therapy               [] Aquatic Therapy       Other:          Frequency/Duration:  # Days per week: [] 1 day # Weeks: [] 1 week [] 5 weeks     [x] 2 days   [] 2 weeks [x] 6 weeks     [] 3 days   [] 3 weeks [] 7 weeks     [] 4 days   [] 4 weeks [] 8 weeks         [] 9 weeks [] 10 weeks         [] 11 weeks [] 12 weeks    Rehab Potential/Progress: [] Excellent [x] Good [] Fair  [] Poor     Goals:    Patient goals : regain PLOF     Long term goals  Time Frame for Long term goals : 6 weeks 3/27/22  Long term goal 1: patient will return to work wihout restrictions      Electronically signed by:  Blanca Mendenhall PT, , 2022, 1:23 PM        If you have any questions or concerns, please don't hesitate to call.   Thank you for your referral.      Physician Signature:________________________________Date:_________ TIME: _____  By signing above, therapists plan is approved by physician

## 2022-02-09 NOTE — PROGRESS NOTES
Physical Therapy  Initial Assessment  Date: 2022  Patient Name: Imelda Arrieta  MRN: [id number]  : 1960     Treatment Diagnosis: L ankle pain    Restrictions  Lower Extremity Weight Bearing Restrictions  Left Lower Extremity Weight Bearing: Weight Bearing As Tolerated    Subjective   General  Chart Reviewed: Yes  Patient assessed for rehabilitation services?: Yes  Referring Practitioner: Alayna Desir  Diagnosis: L ankle  sprain  Follows Commands: Within Functional Limits  PT Visit Information  PT Insurance Information: [employer] 2 x/wk x 6 weeks till 3/27/22  Subjective  Subjective: L ankle  sprain 1/3/22 @ work - 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 then crutches x 1 week - now in  boot, currently off work  Pain Screening  Patient Currently in Pain: Yes  Pain Assessment  Pain Assessment: 0-10  Pain Level: 3  Pain Type: Acute pain  Pain Frequency: Intermittent  Clinical Progression: Gradually improving  Vital Signs  Patient Currently in Pain: Yes    Vision/Hearing  Vision  Vision:  (glasses)  Hearing  Hearing: Within functional limits    Orientation  Orientation  Overall Orientation Status: Within Normal Limits    Social/Functional History  Social/Functional History  Lives With: Spouse  Home Layout: One level  Ambulation Assistance: Independent  Transfer Assistance: Independent  Active : Yes  Mode of Transportation: Car  Type of occupation: [employer]    Objective     Observation/Palpation  Edema: 10'R/ 10.5' L    AROM LLE (degrees)  LLE AROM : Exceptions  L Ankle Dorsiflexion 0-20: +3*  L Ankle Plantar Flexion 0-45: +10*  L Ankle Forefoot Inversion 0-40: 5*  L Ankle Forefoot Eversion 0-20: 5*    Strength LLE  Strength LLE: Exception  L Ankle Dorsiflexion: At least;3/5  L Ankle Plantar Flexion:  At least;3/5                      Ambulation  Ambulation?: Yes  Ambulation 1  Device: No Device  Other Apparatus: AFO  Gait Deviations: Slow Vanessa                            Assessment   Conditions Requiring Skilled Therapeutic

## 2022-02-10 ENCOUNTER — HOSPITAL ENCOUNTER (OUTPATIENT)
Dept: PHYSICAL THERAPY | Age: 62
Setting detail: THERAPIES SERIES
Discharge: HOME | End: 2022-02-10
Payer: COMMERCIAL

## 2022-02-10 PROCEDURE — 97110 THERAPEUTIC EXERCISES: CPT

## 2022-02-10 NOTE — FLOWSHEET NOTE
Outpatient Physical Therapy  [place]           [] Phone: [phone]   Fax: [phone]  [place] [facility]           [x] Phone: [phone]   Fax: [phone]        Physical Therapy Daily Treatment Note  Date:  2/10/2022    Patient Name:  Chinmay Westbrook    :  1960  MRN: [id number]  Restrictions/Precautions:    Diagnosis:   Diagnosis: L ankle  sprain  Date of Injury/Surgery:   Treatment Diagnosis: Treatment Diagnosis: L ankle pain    Insurance/Certification information: PT Insurance Information: [employer] 2 x/wk x 6 weeks till 3/27/22   Referring Physician:  Referring Practitioner: Willow Shepherd Doctor Visit:    Plan of care signed (Y/N):    Outcome Measure: TUG 16 sec/LEFS   Visit# / total visits:    Pain level: 0/10 L ankle  Goals:     Patient goals : regain PLOF     Long term goals  Time Frame for Long term goals : 6 weeks 3/27/22  Long term goal 1: patient will return to work wihout restrictions      ASSESSMENT  Patient primary complaints:  L ankle pain    History of condition: L ankle  sprain(inversion) 1/3/22 @ work - 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 then crutches x 1 week - now in  boot, currently off work  Current functional limitations: off work -unable to accommodate work restrictions; wearing walking boot  Clinical findings:TUG 16 sec/LEFS ; L ankle swollen  PLOF:Pt was independent with ADLs/IADLs without significant pain or difficulties prior to ankle injury  Skilled PT interventions are intended to:  Decrease pain and restore mobility which will enable patient  to return to PLOF  Patient agrees with established plan of care and assisted in the development of their  goals  Barriers to learning:none- no mental/cognitive barriers observed  Preferred learning style(s):   written- demonstration -practice  Preferred Language: English  Potential barriers to progress:none  The patient appears motivated to participate in PT and regain PLOF: yes      Subjective:  Patient reports being comfortable wearing a shoe and elastic ankle wrap      Any changes in Ambulatory Summary Sheet? None        Objective:   No increase in pain noted  COVID screening questions were asked and patient attested that there had been no contact or symptoms        Exercises: (No more than 4 columns)   Exercise/Equipment Date 2/9/22 Date 2/10/2022 Date           WARM UP      Nustep     S12/A12 Lv3 10'          TABLE      Ankle AROM DF/PF 20x    Towel stretch 15 ct x3 3x30\" Slant board   alphabet 1 set 1 set Rocker board taps and balance   Ball roll on arch 3' 3' FWD step ups 4\"      [place]  3' 3'    STANDING      On aeromat  Cane raises overhead./ side to side Pinkie Mower raises overhead./ side to side  1' ea way    On aeromat EO/ EC x 30 sec EC 3x30\"    High marches 3 laps 3 laps                                 PROPRIOCEPTION                                    MODALITIES                      Other Therapeutic Activities/Education:        Home Exercise Program:  Ankle AROM/ towel stretch/ alphabet/ ball roll - patient expressed understanding - provided with handout      Manual Treatments:        Modalities:        Communication with other providers:        Assessment:  (Response towards treatment session) (Pain Rating)    Pt tolerated  treatment without any adverse reactions or complications this date.  . Pt would continue to benefit from skilled therapy interventions to address remaining impairments, improve mobility and strength,  and progress toward goal completion and prepare for d/c including finalizing HEP   Pain complaints after session 0/10    Plan for Next Session:        Time In / Time Out:   0946/1025        If [facility] Please Indicate Time In/Out/Total Time  CPT Code Time in Time out Total Time          750739  591 8048  46   VASO 27524                                       Total for session             Timed Code/Total Treatment Minutes:  39te      Next Progress Note due:        Plan of Care Interventions:  [x] Therapeutic Exercise  [] Modalities:  [x] Therapeutic Activity     [] Ultrasound  [] Estim  [x] Gait Training      [] Cervical Traction [] Lumbar Traction  [x] Neuromuscular Re-education    [] Cold/hotpack [] Iontophoresis   [x] Instruction in HEP      [x] Vasopneumatic   [] Dry Needling    [x] Manual Therapy               [] Aquatic Therapy              Electronically signed by:  Jenna Monson PT/Pooja Hilton PTA, 2/10/2022, 9:46 AM

## 2022-02-11 ENCOUNTER — CLINICAL DOCUMENTATION (OUTPATIENT)
Dept: ONCOLOGY | Age: 62
End: 2022-02-11

## 2022-02-11 NOTE — TELEPHONE ENCOUNTER
Called patient after discussion with Dr Adolfo Colindres. Dr Adolfo Colindres stated that patient may still receive a phlebotomy unless his injury involves blood loss or active bleeding. He states that the procedure may be delayed a week for healing time or if patient prefers. Left a VM on patient's phone with instructions to call back if he has questions.

## 2022-02-14 ENCOUNTER — CLINICAL DOCUMENTATION (OUTPATIENT)
Dept: ONCOLOGY | Age: 62
End: 2022-02-14

## 2022-02-14 NOTE — PROGRESS NOTES
Patient called with questions about receiving a phlebotomy after an ankle injury. This nurse had discussed with Dr Radha Bledsoe and left a message for patient on 02/11/22. Patient called back to discuss further. Reviewed CBC with patient and informed patient that Dr Radha Bledsoe stated that patient could receive a phlebotomy. Patient's ankle injury was from a fall and is not actively bleeding. Patient verbalized understanding an states that he will call infusion to schedule.

## 2022-02-15 ENCOUNTER — HOSPITAL ENCOUNTER (OUTPATIENT)
Dept: INFUSION THERAPY | Age: 62
Setting detail: INFUSION SERIES
Discharge: HOME | End: 2022-02-15
Payer: COMMERCIAL

## 2022-02-15 VITALS
OXYGEN SATURATION: 98 % | DIASTOLIC BLOOD PRESSURE: 69 MMHG | TEMPERATURE: 97.5 F | HEART RATE: 91 BPM | RESPIRATION RATE: 18 BRPM | SYSTOLIC BLOOD PRESSURE: 133 MMHG

## 2022-02-15 PROCEDURE — 99211 OFF/OP EST MAY X REQ PHY/QHP: CPT

## 2022-02-15 PROCEDURE — 99195 PHLEBOTOMY: CPT

## 2022-02-15 ASSESSMENT — PAIN SCALES - GENERAL: PAINLEVEL_OUTOF10: 1

## 2022-02-15 ASSESSMENT — PAIN DESCRIPTION - PROGRESSION: CLINICAL_PROGRESSION: GRADUALLY IMPROVING

## 2022-02-15 ASSESSMENT — PAIN DESCRIPTION - FREQUENCY: FREQUENCY: CONTINUOUS

## 2022-02-15 ASSESSMENT — PAIN DESCRIPTION - PAIN TYPE: TYPE: ACUTE PAIN

## 2022-02-15 NOTE — PROGRESS NOTES
Diagnosis: POLYCYTHEMIA    Pre-Phlebotomy: BP BP (!) 161/77   Pulse 90   Temp 97.5 °F (36.4 °C) (Infrared)   Resp 18       Post-Phlebotomy: /77, HR 92    Volume Removed: 634 grams    Complications: NONE    Comments: TOLERATED PHLEBOTOMY WELL        LOT# [id number]    Exp: 5-23      Vasile Coronado RN

## 2022-02-16 ENCOUNTER — HOSPITAL ENCOUNTER (OUTPATIENT)
Dept: PHYSICAL THERAPY | Age: 62
Setting detail: THERAPIES SERIES
Discharge: HOME | End: 2022-02-16
Payer: COMMERCIAL

## 2022-02-16 PROCEDURE — 97110 THERAPEUTIC EXERCISES: CPT

## 2022-02-16 NOTE — FLOWSHEET NOTE
Outpatient Physical Therapy  [place]           [] Phone: [phone]   Fax: [phone]  [place] Muñiz           [x] Phone: [phone]   Fax: [phone]        Physical Therapy Daily Treatment Note  Date:  2022    Patient Name:  Holger Thompson    :  1960  MRN: [id number]  Restrictions/Precautions:    Diagnosis:   Diagnosis: L ankle  sprain  Date of Injury/Surgery:   Treatment Diagnosis: Treatment Diagnosis: L ankle pain    Insurance/Certification information: PT Insurance Information: [employer]WC 2 x/wk x 6 weeks till 3/27/22   Referring Physician:  Referring Practitioner: Tony Henry  Next Doctor Visit:    Plan of care signed (Y/N):    Outcome Measure: TUG 16 sec/LEFS   Visit# / total visits: 3 /12  Pain level: 1/10 L ankle  Goals:     Patient goals : regain PLOF     Long term goals  Time Frame for Long term goals : 6 weeks 3/27/22  Long term goal 1: patient will return to work wihout restrictions      ASSESSMENT  Patient primary complaints:  L ankle pain    History of condition: L ankle  sprain(inversion) 1/3/22 @ work - 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 then crutches x 1 week - now in  boot, currently off work  Current functional limitations: off work -unable to accommodate work restrictions; wearing walking boot  Clinical findings:TUG 16 sec/LEFS ; L ankle swollen  PLOF:Pt was independent with ADLs/IADLs without significant pain or difficulties prior to ankle injury  Skilled PT interventions are intended to:  Decrease pain and restore mobility which will enable patient  to return to PLOF  Patient agrees with established plan of care and assisted in the development of their  goals  Barriers to learning:none- no mental/cognitive barriers observed  Preferred learning style(s):   written- demonstration -practice  Preferred Language: English  Potential barriers to progress:none  The patient appears motivated to participate in PT and regain PLOF: yes      Subjective:  Patient reports of 1/10 pain upon arrival and voices no new c/o. Any changes in Ambulatory Summary Sheet? None        Objective:  New exs added without increased pain   COVID screening questions were asked and patient attested that there had been no contact or symptoms        Exercises: (No more than 4 columns)   Exercise/Equipment Date 2/9/22 Date 2/10/2022 Date 2/16/22           WARM UP      Nustep     S12/A12 Lv3 10' S12/A12 Lv3 10'         TABLE      Ankle AROM DF/PF 20x ------   Towel stretch 15 ct x3 3x30\" Slant board  3x30\"   alphabet 1 set 1 set Rocker board taps and balance both ways 1' for al   Ball roll on arch 3' 3' FWD step ups 4\" 2x10      [place]  3' 3' ----   STANDING      On aeromat  Cane raises overhead./ side to side Emily Jury raises overhead./ side to side  1' ea way Emily Jury raises overhead./ side to side  1' ea way   On aeromat EO/ EC x 30 sec EC 3x30\" EC 3x30\"   High marches 3 laps 3 laps 3 laps   BAPS all 4 direcs   Lv2  10x ea way                           PROPRIOCEPTION                                    MODALITIES                      Other Therapeutic Activities/Education:        Home Exercise Program:  Ankle AROM/ towel stretch/ alphabet/ ball roll - patient expressed understanding - provided with handout      Manual Treatments:        Modalities:        Communication with other providers:        Assessment:  (Response towards treatment session) (Pain Rating)    Pt tolerated  treatment without any adverse reactions or complications this date.  . Pt would continue to benefit from skilled therapy interventions to address remaining impairments, improve mobility and strength,  and progress toward goal completion and prepare for d/c including finalizing HEP   Pain complaints after session 1/10    Plan for Next Session:        Time In / Time Out:  0249/1926        If [facility] Please Indicate Time In/Out/Total Time  CPT Code Time in Time out Total Time          142999 9720 8618 44   VASO [id number]                                       Total for session             Timed Code/Total Treatment Minutes:   43te      Next Progress Note due:        Plan of Care Interventions:  [x] Therapeutic Exercise  [] Modalities:  [x] Therapeutic Activity     [] Ultrasound  [] Estim  [x] Gait Training      [] Cervical Traction [] Lumbar Traction  [x] Neuromuscular Re-education    [] Cold/hotpack [] Iontophoresis   [x] Instruction in HEP      [x] Vasopneumatic   [] Dry Needling    [x] Manual Therapy               [] Aquatic Therapy              Electronically signed by:  Amadou Anaya PTA, 2/16/2022, 9:07 AM

## 2022-02-18 ENCOUNTER — HOSPITAL ENCOUNTER (OUTPATIENT)
Dept: PHYSICAL THERAPY | Age: 62
Setting detail: THERAPIES SERIES
Discharge: HOME | End: 2022-02-18
Payer: COMMERCIAL

## 2022-02-18 PROCEDURE — 97110 THERAPEUTIC EXERCISES: CPT

## 2022-02-18 NOTE — FLOWSHEET NOTE
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   Referring Physician:  Referring Practitioner: Trey Shepherd Doctor Visit:    Plan of care signed (Y/N):    Outcome Measure: TUG 16 sec/LEFS   Visit# / total visits:   Pain level: 1/10 L ankle  Goals:     Patient goals : regain PLOF     Long term goals  Time Frame for Long term goals : 6 weeks 3/27/22  Long term goal 1: patient will return to work wihout restrictions      ASSESSMENT  Patient primary complaints:  L ankle pain    History of condition: L ankle  sprain(inversion) 1/3/22 @ work - 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 then crutches x 1 week - now in  boot, currently off work  Current functional limitations: off work -unable to accommodate work restrictions; wearing walking boot  Clinical findings:TUG 16 sec/LEFS ; L ankle swollen  PLOF:Pt was independent with ADLs/IADLs without significant pain or difficulties prior to ankle injury  Skilled PT interventions are intended to:  Decrease pain and restore mobility which will enable patient  to return to PLOF  Patient agrees with established plan of care and assisted in the development of their  goals  Barriers to learning:none- no mental/cognitive barriers observed  Preferred learning style(s):   written- demonstration -practice  Preferred Language: English  Potential barriers to progress:none  The patient appears motivated to participate in PT and regain PLOF: yes      Subjective:  Patient reports of 1/10 pain upon arrival and voices no new c/o. Any changes in Ambulatory Summary Sheet? None        Objective: Increased tightness in the A. M. persists  COVID screening questions were asked and patient attested that there had been no contact or symptoms        Exercises: (No more than 4 columns)   Exercise/Equipment Date 2/9/22 Date 2/10/2022 Date 2/16/22 2/18/22            WARM UP       Nustep     S12/A12 Lv3 10' S12/A12 Lv3 10' S12/A12 Lv3 10'          TABLE       Ankle AROM DF/PF 20x ------ x   Towel stretch 15 ct x3 3x30\" Slant board  3x30\" Slant board  3x30\"   alphabet 1 set 1 set Rocker board taps and balance both ways 1' for all Rocker board taps and balance both ways 1' for all   Ball roll on arch 3' 3' FWD step ups 4\" 2x10 FWD step ups 4\" 2x10      [place]  3' 3' ---- x   STANDING       On aeromat  Cane raises overhead./ side to side Sharan Havers raises overhead./ side to side  1' ea way Sharan Havers raises overhead./ side to side  1' ea way Sharan Havers raises overhead./ side to side  1' ea way   On aeromat EO/ EC x 30 sec EC 3x30\" EC 3x30\" EC 3x30\"   High marches 3 laps 3 laps 3 laps 3 laps   BAPS all 4 direcs   Lv2  10x ea way  Lv2  10x2 ea way  R foot on 4\" step                             PROPRIOCEPTION                                          MODALITIES                         Other Therapeutic Activities/Education:        Home Exercise Program:  Ankle AROM/ towel stretch/ alphabet/ ball roll - patient expressed understanding - provided with handout      Manual Treatments:        Modalities:        Communication with other providers:        Assessment:  (Response towards treatment session) (Pain Rating)    Pt tolerated  treatment without any adverse reactions or complications this date.  . Pt would continue to benefit from skilled therapy interventions to address remaining impairments, improve mobility and strength,  and progress toward goal completion and prepare for d/c including finalizing HEP   Pain complaints after session 1/10    Plan for Next Session:        Time In / Time Out:  8288/2233        If [facility] Please Indicate Time In/Out/Total Time  CPT Code Time in Time out Total Time          928295  0912  0952  40   VASO 65242                                       Total for session             Timed Code/Total Treatment Minutes:          Next Progress Note due:        Plan of Care Interventions:  [x] Therapeutic Exercise  [] Modalities:  [x] Therapeutic Activity     [] Ultrasound  [] Estim  [x] Gait Training      [] Cervical Traction [] Lumbar Traction  [x] Neuromuscular Re-education    [] Cold/hotpack [] Iontophoresis   [x] Instruction in HEP      [x] Vasopneumatic   [] Dry Needling    [x] Manual Therapy               [] Aquatic Therapy              Electronically signed by:  Duyen Partida PTA, 2/18/2022, 9:12 AM

## 2022-02-21 ENCOUNTER — HOSPITAL ENCOUNTER (OUTPATIENT)
Dept: PHYSICAL THERAPY | Age: 62
Setting detail: THERAPIES SERIES
Discharge: HOME | End: 2022-02-21
Payer: COMMERCIAL

## 2022-02-21 PROCEDURE — 97110 THERAPEUTIC EXERCISES: CPT

## 2022-02-21 NOTE — FLOWSHEET NOTE
Outpatient Physical Therapy  [place]           [] Phone: [phone]   Fax: [phone]  [place]           [x] Phone: [phone]   Fax: [phone]        Physical Therapy Daily Treatment Note  Date:  2022    Patient Name:  Klaudia Tesfaye    :  1960  MRN: [id number]  Restrictions/Precautions:    Diagnosis:   Diagnosis: L ankle  sprain  Date of Injury/Surgery:   Treatment Diagnosis: Treatment Diagnosis: L ankle pain    Insurance/Certification information: PT Insurance Information: BWC 2 x/wk x 6 weeks till 3/27/22   Referring Physician:  Referring Practitioner: Elyssa Shepherd Doctor Visit:    Plan of care signed (Y/N):    Outcome Measure: TUG 16 sec/LEFS   Visit# / total visits:   Pain level: 1/10 L ankle  Goals:     Patient goals : regain PLOF     Long term goals  Time Frame for Long term goals : 6 weeks 3/27/22  Long term goal 1: patient will return to work wihout restrictions      ASSESSMENT  Patient primary complaints:  L ankle pain    History of condition: L ankle  sprain(inversion) 1/3/22 @ work - 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 then crutches x 1 week - now in  boot, currently off work  Current functional limitations: off work -unable to accommodate work restrictions; wearing walking boot  Clinical findings:TUG 16 sec/LEFS ; L ankle swollen  PLOF:Pt was independent with ADLs/IADLs without significant pain or difficulties prior to ankle injury  Skilled PT interventions are intended to:  Decrease pain and restore mobility which will enable patient  to return to PLOF  Patient agrees with established plan of care and assisted in the development of their  goals  Barriers to learning:none- no mental/cognitive barriers observed  Preferred learning style(s):   written- demonstration -practice  Preferred Language: English  Potential barriers to progress:none  The patient appears motivated to participate in PT and regain PLOF: yes      Subjective:  Patient reports of 1/10 pain upon arrival and voices no new c/o. Any changes in Ambulatory Summary Sheet? None        Objective:     COVID screening questions were asked and patient attested that there had been no contact or symptoms        Exercises: (No more than 4 columns)   Exercise/Equipment Date 2/16/22 2/18/22 2/21/22           WARM UP      Nustep    S12/A12 Lv3 10' S12/A12 Lv3 10' S12/A12 Lv3 10'         TABLE      Towel stretch Slant board  3x30\" Slant board  3x30\" Slant board 1'   alphabet Rocker board taps and balance both ways 1' for all Rocker board taps and balance both ways 1' for all Rocker board taps and balance both ways 1' for all   Step ups FWD step ups 4\" 2x10 FWD step ups 4\" 2x10 6\" 2x10  W/knee up   STANDING      On [employer] raises overhead./ side to side  1' ea way Pinkie Mower raises overhead./ side to side  1' ea way Pinkie Mower raises overhead./ side to side  1' ea way   On aeromat EC 3x30\" EC 3x30\" EC 2x30\"   High marches 3 laps 3 laps 3 laps   BAPS all 4 direcs Lv2  10x ea way  Lv2  10x2 ea way  R foot on 4\" step Lv2  10x2 ea way  R foot on 4\" step   Tandem stance   2x30\" ea way   Step overs   4\" 2x10                                            PROPRIOCEPTION                                    MODALITIES                      Other Therapeutic Activities/Education:        Home Exercise Program:  Ankle AROM/ towel stretch/ alphabet/ ball roll - patient expressed understanding - provided with handout      Manual Treatments:        Modalities:        Communication with other providers:        Assessment:  (Response towards treatment session) (Pain Rating)    Pt tolerated  treatment without any adverse reactions or complications this date.  . Pt would continue to benefit from skilled therapy interventions to address remaining impairments, improve mobility and strength,  and progress toward goal completion and prepare for d/c including finalizing HEP   Pain complaints after session 1/10    Plan for Next Session:        Time In / Time Out: 2750/9422       If [facility] Please Indicate Time In/Out/Total Time  CPT Code Time in Time out Total Time          010153  7725  6887 92   BIEG 24333                                       Total for session             Timed Code/Total Treatment Minutes:          Next Progress Note due:        Plan of Care Interventions:  [x] Therapeutic Exercise  [] Modalities:  [x] Therapeutic Activity     [] Ultrasound  [] Estim  [x] Gait Training      [] Cervical Traction [] Lumbar Traction  [x] Neuromuscular Re-education    [] Cold/hotpack [] Iontophoresis   [x] Instruction in HEP      [x] Vasopneumatic   [] Dry Needling    [x] Manual Therapy               [] Aquatic Therapy              Electronically signed by:  Yvonne Alvarenga PTA, 2/21/2022, 9:36 AM

## 2022-02-23 ENCOUNTER — HOSPITAL ENCOUNTER (OUTPATIENT)
Dept: PHYSICAL THERAPY | Age: 62
Setting detail: THERAPIES SERIES
Discharge: HOME | End: 2022-02-23
Payer: COMMERCIAL

## 2022-02-23 PROCEDURE — 97110 THERAPEUTIC EXERCISES: CPT

## 2022-02-23 NOTE — FLOWSHEET NOTE
Outpatient Physical Therapy  [place]           [] Phone: [phone]   Fax: [phone]  Kadeem Li           [x] Phone: [phone]   Fax: [phone]        Physical Therapy Daily Treatment Note  Date:  2022    Patient Name:  Williams Zarate    :  1960  MRN: [id number]  Restrictions/Precautions:    Diagnosis:   Diagnosis: L ankle  sprain  Date of Injury/Surgery:   Treatment Diagnosis: Treatment Diagnosis: L ankle pain    Insurance/Certification information: PT Insurance Information: BWC 2 x/wk x 6 weeks till 3/27/22   Referring Physician:  Referring Practitioner: Jennifer Shepherd Doctor Visit:    Plan of care signed (Y/N):    Outcome Measure: TUG 16 sec/LEFS   Visit# / total visits:   Pain level: 1/10 L ankle  Goals:     Patient goals : regain PLOF     Long term goals  Time Frame for Long term goals : 6 weeks 3/27/22  Long term goal 1: patient will return to work wihout restrictions      ASSESSMENT  Patient primary complaints:  L ankle pain    History of condition: L ankle  sprain(inversion) 1/3/22 @ work - 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 then crutches x 1 week - now in  boot, currently off work  Current functional limitations: off work -unable to accommodate work restrictions; wearing walking boot  Clinical findings:TUG 16 sec/LEFS ; L ankle swollen  PLOF:Pt was independent with ADLs/IADLs without significant pain or difficulties prior to ankle injury  Skilled PT interventions are intended to:  Decrease pain and restore mobility which will enable patient  to return to PLOF  Patient agrees with established plan of care and assisted in the development of their  goals  Barriers to learning:none- no mental/cognitive barriers observed  Preferred learning style(s):   written- demonstration -practice  Preferred Language: English  Potential barriers to progress:none  The patient appears motivated to participate in PT and regain PLOF: yes      Subjective:  Patient reports swelling will still occur but is less than last week; patient feels that swelling makes his ankle feel stiff      Any changes in Ambulatory Summary Sheet? None        Objective:   L PF strength @ least 3+/5  COVID screening questions were asked and patient attested that there had been no contact or symptoms        Exercises: (No more than 4 columns)   Exercise/Equipment Date 2/16/22 2/18/22 2/21/22 2/23/22            WARM UP       Nustep    S12/A12 Lv3 10' S12/A12 Lv3 10' S12/A12 Lv3 10' S12/A12 Lv3 10'          TABLE       Towel stretch Slant board  3x30\" Slant board  3x30\" Slant board 1' [address] board 1. 5'   Rocker board Rocker board taps and balance both ways 1' for all Rocker board taps and balance both ways 1' for all Rocker board taps and balance both ways 1' for all Rocker board taps and balance both ways 2' for all   Step ups FWD step ups 4\" 2x10 FWD step ups 4\" 2x10 6\" 2x10  W/knee up    STANDING       On [employer] raises overhead./ side to side  1' ea way Bernadene Abdiel raises overhead./ side to side  1' ea way Bernadene Abdiel raises overhead./ side to side  1' ea way stop   On aeromat EC 3x30\" EC 3x30\" EC 2x30\" EC 2x30\"   High marches 3 laps 3 laps 3 laps 3 laps   BAPS all 4 direcs Lv2  10x ea way  Lv2  10x2 ea way  R foot on 4\" step Lv2  10x2 ea way  R foot on 4\" step Lv2  10x2 ea way  R foot on 4\" step   Tandem stance   2x30\" ea way 2x30\" standing on aemat   Step overs   4\" 2x10                                                   PROPRIOCEPTION                                          MODALITIES                         Other Therapeutic Activities/Education:        Home Exercise Program:  Ankle AROM/ towel stretch/ alphabet/ ball roll - patient expressed understanding - provided with handout      Manual Treatments:        Modalities:        Communication with other providers:        Assessment:  (Response towards treatment session) (Pain Rating)    Pt tolerated  treatment without any adverse reactions or complications this date.  . Pt would continue to benefit from skilled therapy interventions to address remaining impairments, improve mobility and strength,  and progress toward goal completion and prepare for d/c including finalizing HEP   Pain complaints after session 1/10    Plan for Next Session:        Time In / Time Out:        If BWC Please Indicate Time In/Out/Total Time  CPT Code Time in Time out Total Time          190777  7428 1115 46   VASO 10192                                       Total for session      46       Timed Code/Total Treatment Minutes:          Next Progress Note due:        Plan of Care Interventions:  [x] Therapeutic Exercise  [] Modalities:  [x] Therapeutic Activity     [] Ultrasound  [] Estim  [x] Gait Training      [] Cervical Traction [] Lumbar Traction  [x] Neuromuscular Re-education    [] Cold/hotpack [] Iontophoresis   [x] Instruction in HEP      [x] Vasopneumatic   [] Dry Needling    [x] Manual Therapy               [] Aquatic Therapy              Electronically signed by:  Sandie Clark PT   , 2/23/2022, 10:29 AM

## 2022-02-28 ENCOUNTER — HOSPITAL ENCOUNTER (OUTPATIENT)
Dept: PHYSICAL THERAPY | Age: 62
Setting detail: THERAPIES SERIES
Discharge: HOME | End: 2022-02-28
Payer: COMMERCIAL

## 2022-02-28 PROCEDURE — 97110 THERAPEUTIC EXERCISES: CPT

## 2022-02-28 NOTE — FLOWSHEET NOTE
Outpatient Physical Therapy  [place]           [] Phone: [phone]   Fax: [phone]  Matt Morley           [x] Phone: [phone]   Fax: [phone]        Physical Therapy Daily Treatment Note  Date:  2022    Patient Name:  Leon Castro    :  1960  MRN: [id number]  Restrictions/Precautions:    Diagnosis:   Diagnosis: L ankle  sprain  Date of Injury/Surgery:   Treatment Diagnosis: Treatment Diagnosis: L ankle pain    Insurance/Certification information: PT Insurance Information: BWC 2 x/wk x 6 weeks till 3/27/22   Referring Physician:  Referring Practitioner: Nakia Shepherd Doctor Visit:    Plan of care signed (Y/N):    Outcome Measure: TUG 16 sec/LEFS   Visit# / total visits:   Pain level: 10 L ankle  Goals:     Patient goals : regain PLOF     Long term goals  Time Frame for Long term goals : 6 weeks 3/27/22  Long term goal 1: patient will return to work wihout restrictions      ASSESSMENT  Patient primary complaints:  L ankle pain    History of condition: L ankle  sprain(inversion) 1/3/22 @ work - 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 then crutches x 1 week - now in  boot, currently off work  Current functional limitations: off work -unable to accommodate work restrictions; wearing walking boot  Clinical findings:TUG 16 sec/LEFS ; L ankle swollen  PLOF:Pt was independent with ADLs/IADLs without significant pain or difficulties prior to ankle injury  Skilled PT interventions are intended to:  Decrease pain and restore mobility which will enable patient  to return to PLOF  Patient agrees with established plan of care and assisted in the development of their  goals  Barriers to learning:none- no mental/cognitive barriers observed  Preferred learning style(s):   written- demonstration -practice  Preferred Language: English  Potential barriers to progress:none  The patient appears motivated to participate in PT and regain PLOF: yes      Subjective:  Patient reports wearing his brace for all ADL/IADL      Any changes in Ambulatory Summary Sheet? None        Objective:   L PF strength @ least 3+/5  COVID screening questions were asked and patient attested that there had been no contact or symptoms        Exercises: (No more than 4 columns)   Exercise/Equipment 2/18/22 2/21/22 2/23/22 2/28/22            WARM UP       Nustep    S12/A12 Lv3 10' S12/A12 Lv3 10' S12/A12 Lv3 10' S12/A12 Lv5 10'          TABLE       Towel stretch Slant board  3x30\" Slant board 1' Slant board 1.5' [address] board 1. 5'   Rocker board Rocker board taps and balance both ways 1' for all Rocker board taps and balance both ways 1' for all Rocker board taps and balance both ways 2' for all Rocker board taps and balance both ways 2' for all   Step ups FWD step ups 4\" 2x10 6\" 2x10  W/knee up  stop   STANDING       On aeromat  Cane raises overhead./ side to side  1' ea way Nadir Quick raises overhead./ side to side  1' ea way stop stop   On aeromat EC 3x30\" EC 2x30\" EC 2x30\" EC 2x30\"   High marches 3 laps 3 laps 3 laps stop   BAPS all 4 direcs Lv2  10x2 ea way  R foot on 4\" step Lv2  10x2 ea way  R foot on 4\" step Lv2  10x2 ea way  R foot on 4\" step Lv2CW x 2'   Tandem stance  2x30\" ea way 2x30\" standing on aemat 2x30\" standing on aeromat   Step overs  4\" 2x10  stop   walkouts    Holding handle 10# lateral walk R/L x 5 reps ea   STS    From chair x 30 sec   SL balance    x30 sec as able    Heel ups    L single leg x 10                      PROPRIOCEPTION                                          MODALITIES                         Other Therapeutic Activities/Education:        Home Exercise Program:  Ankle AROM/ towel stretch/ alphabet/ ball roll - patient expressed understanding - provided with handout      Manual Treatments:        Modalities:        Communication with other providers:        Assessment:  (Response towards treatment session) (Pain Rating)    Pt tolerated  treatment without any adverse reactions or complications this date. Javier Case  Pt would continue to benefit from skilled therapy interventions to address remaining impairments, improve mobility and strength,  and progress toward goal completion and prepare for d/c including finalizing HEP   Pain complaints after session 1/10    Plan for Next Session:        Time In / Time Out:        If BWC Please Indicate Time In/Out/Total Time  CPT Code Time in Time out Total Time          [id number]  1432 1510 38   VASO 19933                                       Total for session      38       Timed Code/Total Treatment Minutes:          Next Progress Note due:        Plan of Care Interventions:  [x] Therapeutic Exercise  [] Modalities:  [x] Therapeutic Activity     [] Ultrasound  [] Estim  [x] Gait Training      [] Cervical Traction [] Lumbar Traction  [x] Neuromuscular Re-education    [] Cold/hotpack [] Iontophoresis   [x] Instruction in HEP      [x] Vasopneumatic   [] Dry Needling    [x] Manual Therapy               [] Aquatic Therapy              Electronically signed by:  Jamseon Hoover PT   , 2/28/2022, 2:37 PM

## 2022-03-01 ENCOUNTER — OFFICE VISIT (OUTPATIENT)
Dept: ORTHOPEDIC SURGERY | Age: 62
End: 2022-03-01
Payer: COMMERCIAL

## 2022-03-01 ENCOUNTER — TELEPHONE (OUTPATIENT)
Dept: ORTHOPEDIC SURGERY | Age: 62
End: 2022-03-01

## 2022-03-01 VITALS
BODY MASS INDEX: 29.62 KG/M2 | RESPIRATION RATE: 15 BRPM | OXYGEN SATURATION: 99 % | HEART RATE: 89 BPM | WEIGHT: 200 LBS | HEIGHT: 69 IN

## 2022-03-01 DIAGNOSIS — S93.492A SPRAIN OF ANTERIOR TALOFIBULAR LIGAMENT OF LEFT ANKLE, INITIAL ENCOUNTER: Primary | ICD-10-CM

## 2022-03-01 PROCEDURE — 99213 OFFICE O/P EST LOW 20 MIN: CPT | Performed by: ORTHOPAEDIC SURGERY

## 2022-03-01 NOTE — TELEPHONE ENCOUNTER
BWC        Continue weight-bearing as tolerated. Continue range of motion exercises as instructed. Ice and elevate as needed. Tylenol or Motrin for pain. Follow up in 1 month. Return to work with no lifting pushing or pulling over 20lbs. Sit down breaks as needed. No prolonged standing or walking for more than half an hour at a time. No climbing.

## 2022-03-01 NOTE — LETTER
[facility]  [address] [address]  Phone: [phone]    Trupti Perez MD        March 1, 2022     Patient: Jeremy Daugherty   YOB: 1960   Date of Visit: 3/1/2022       To Whom It May Concern: It is my medical opinion that Cassandra Moore may return to work on 02/03/22 with restrictions such as: no lifting pushing or pulling over 20lbs. Sit down breaks as needed. No prolonged standing or walking for more than half an hour at a time. No climbing. If you have any questions or concerns, please don't hesitate to call.     Sincerely,        Trupti Perez MD

## 2022-03-01 NOTE — PROGRESS NOTES
Patient returns to the office with a left ankle injury through [address] [place] [address]. Patient stated his pain is subsiding and does not have any pain sitting here today. Pt stated he has been going to physical therapy twice a week and been gaining some ROM. Pt stated he will have a little aching and discomfort after physical therapy but only about a 2/10 on the pain scale. Pt stated he has continued to wear the brace and helps with stability. Pt denies any new injuries.

## 2022-03-01 NOTE — LETTER
[facility]  [address] [address]  Phone: [phone]    Purnima Chambers MD        March 1, 2022     Patient: Klaudia Tesfaye   YOB: 1960   Date of Visit: 3/1/2022       To Whom It May Concern: It is my medical opinion that Jatinder Phillips may return to work on 03/03/22 with restrictions such as: no lifting pushing or pulling over 20lbs. Sit down breaks as needed. No prolonged standing or walking for more than half an hour at a time. No climbing. If you have any questions or concerns, please don't hesitate to call.     Sincerely,        Purnima Chambers MD

## 2022-03-01 NOTE — PATIENT INSTRUCTIONS
Continue weight-bearing as tolerated. Continue range of motion exercises as instructed. Ice and elevate as needed. Tylenol or Motrin for pain. Follow up in 1 month. Return to work with no lifting pushing or pulling over 20lbs. Sit down breaks as needed. No prolonged standing or walking for more than half an hour at a time. No climbing.

## 2022-03-02 ENCOUNTER — HOSPITAL ENCOUNTER (OUTPATIENT)
Dept: PHYSICAL THERAPY | Age: 62
Discharge: HOME | End: 2022-03-02

## 2022-03-02 NOTE — FLOWSHEET NOTE
Physical Therapy  Cancellation/No-show Note  Patient Name:  Dulce Moctezuma  :  1960   Date:  3/2/2022  Cancelled visits to date: 1  No-shows to date: 0    For today's appointment patient:  [x]  Cancelled  []  Rescheduled appointment  []  No-show     Reason given by patient:  [x]  Patient ill  []  Conflicting appointment  []  No transportation    []  Conflict with work  []  No reason given  []  Other:     Comments:      Electronically signed by:  Jazlyn Levy PTA

## 2022-03-04 ASSESSMENT — ENCOUNTER SYMPTOMS
COLOR CHANGE: 0
CHEST TIGHTNESS: 0
SHORTNESS OF BREATH: 0

## 2022-03-04 NOTE — PROGRESS NOTES
3/1/2022   Chief Complaint   Patient presents with    Knee Injury     left [facility]        History of Present Illness:                             Vicente Ann is a 62 y.o. male returns today for follow-up of his left ankle. He has noticed improvement in his range of motion and strength and stability. Has been working with physical therapy and has been using his brace. He feels that his ankle is better with less pain but still has discomfort and stiffness especially with heavy or repetitive activities. Patient returns to the office with a left ankle injury through [address] [place] [address]. Patient stated his pain is subsiding and does not have any pain sitting here today. Pt stated he has been going to physical therapy twice a week and been gaining some ROM. Pt stated he will have a little aching and discomfort after physical therapy but only about a 2/10 on the pain scale. Pt stated he has continued to wear the brace and helps with stability. Pt denies any new injuries. Medical History  Patient's medications, allergies, past medical, surgical, social and family histories were reviewed and updated as appropriate. Review of Systems   Constitutional: Negative for activity change and fever. Respiratory: Negative for chest tightness and shortness of breath. Cardiovascular: Negative for chest pain. Skin: Negative for color change. Neurological: Negative for dizziness. Psychiatric/Behavioral: The patient is not nervous/anxious. Examination:  General Exam:  Vitals: Pulse 89   Resp 15   Ht 5' 9\" (1.753 m)   Wt 200 lb (90.7 kg)   SpO2 99%   BMI 29.53 kg/m²    Physical Exam       Left lower extremity:  Improved mild tenderness to palpation in mild swelling throughout the ankle most significant on the lateral aspect. Normal alignment of the ankle. No gross instability with stress examination across the ankle.   Sensation motor function is intact throughout the foot.  There is good active dorsiflexion plantarflexion of the ankle with mild restricted range of motion mild discomfort at the extremes of motion at the ankle. Office Procedures:  No orders of the defined types were placed in this encounter. Assessment and Plan  1. Left ankle sprain    He is showing good signs of recovery. I recommended he continue building up his activities as pain allows. We discussed continued use of bracing and anti-inflammatory medications and supportive shoes as needed. I given him a note to return to work with restrictions. No lifting pushing or pulling over 20 pounds. No prolonged standing or walking more than half an hour at a time.   Sit down as needed for rest.    Follow-up in 1 month for check of his progress and we will discuss advancing activities at work        Electronically signed by Florencia Ortiz MD on 3/4/2022 at 12:42 PM

## 2022-03-07 ENCOUNTER — HOSPITAL ENCOUNTER (OUTPATIENT)
Dept: PHYSICAL THERAPY | Age: 62
Setting detail: THERAPIES SERIES
Discharge: HOME | End: 2022-03-07
Payer: COMMERCIAL

## 2022-03-07 PROCEDURE — 97110 THERAPEUTIC EXERCISES: CPT

## 2022-03-07 NOTE — FLOWSHEET NOTE
Outpatient Physical Therapy  [place]           [] Phone: [phone]   Fax: [phone]  Michela Mullins           [x] Phone: [phone]   Fax: [phone]        Physical Therapy Daily Treatment Note  Date:  3/7/2022    Patient Name:  Nikki Fowler    :  1960  MRN: [id number]  Restrictions/Precautions:    Diagnosis:   Diagnosis: L ankle  sprain  Date of Injury/Surgery:   Treatment Diagnosis: Treatment Diagnosis: L ankle pain    Insurance/Certification information: PT Insurance Information: BWC 2 x/wk x 6 weeks till 3/27/22   Referring Physician:  Referring Practitioner: Jourdan Hanson  Next Doctor Visit:    Plan of care signed (Y/N):    Outcome Measure: TUG 16 sec/LEFS   Visit# / total visits:   Pain level: 0/10 L ankle  Goals:     Patient goals : regain PLOF     Long term goals  Time Frame for Long term goals : 6 weeks 3/27/22  Long term goal 1: patient will return to work wihout restrictions      ASSESSMENT  Patient primary complaints:  L ankle pain    History of condition: L ankle  sprain(inversion) 1/3/22 @ work - 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 then crutches x 1 week - now in  boot, currently off work  Current functional limitations: off work -unable to accommodate work restrictions; wearing walking boot  Clinical findings:TUG 16 sec/LEFS ; L ankle swollen  PLOF:Pt was independent with ADLs/IADLs without significant pain or difficulties prior to ankle injury  Skilled PT interventions are intended to:  Decrease pain and restore mobility which will enable patient  to return to PLOF  Patient agrees with established plan of care and assisted in the development of their  goals  Barriers to learning:none- no mental/cognitive barriers observed  Preferred learning style(s):   written- demonstration -practice  Preferred Language: English  Potential barriers to progress:none  The patient appears motivated to participate in PT and regain PLOF: yes      Subjective:  Pt reports no pain today and has been doing well.      Any changes in Ambulatory Summary Sheet? None        Objective:   L PF strength @ least 3+/5  COVID screening questions were asked and patient attested that there had been no contact or symptoms        Exercises: (No more than 4 columns)   Exercise/Equipment 2/28/22 3/7/22          WARM UP     Nustep    S12/A12 Lv5 10' S12/A12 Lv5 10'        TABLE     Towel stretch Slant board 1.5' Slant board 1. 5'   Rocker board Rocker board taps and balance both ways 2' for all Rocker board taps and balance both ways 2' for all   STANDING     On aeromat EC 2x30\" EC 2x30\"   BAPS all 4 direcs Lv2CW x 2' Standing Lv2CW x 2'   Tandem stance 2x30\" standing on aeromat 2x30\" ea, standing on aeromat   walkouts Holding handle 10# lateral walk R/L x 5 reps ea Holding handle 10# lateral walk R/L x 5 reps ea   STS From chair x 30 sec From chair x 30 sec (10 reps)   SL balance x30 sec as able  x30 sec as able    Heel ups L single leg x 10 L single leg 2x10                  PROPRIOCEPTION                              MODALITIES                   Other Therapeutic Activities/Education:  Continue as instructed. Home Exercise Program:  Ankle AROM/ towel stretch/ alphabet/ ball roll       Manual Treatments:        Modalities:        Communication with other providers:        Assessment:  (Response towards treatment session) (Pain Rating)    Pt tolerated  treatment without any adverse reactions or complications this date.  . Pt would continue to benefit from skilled therapy interventions to address remaining impairments, improve mobility and strength,  and progress toward goal completion and prepare for d/c including finalizing HEP   Pain complaints after session 0/10    Plan for Next Session:  Continue to progress strengthand balance as able      Time In / Time Out: 11:18-12:03       If [facility] Please Indicate Time In/Out/Total Time  CPT Code Time in Time out Total Time          827721 2305 2027 45Tre Spears Total for session      45'       Timed Code/Total Treatment Minutes:          Next Progress Note due:        Plan of Care Interventions:  [x] Therapeutic Exercise  [] Modalities:  [x] Therapeutic Activity     [] Ultrasound  [] Estim  [x] Gait Training      [] Cervical Traction [] Lumbar Traction  [x] Neuromuscular Re-education    [] Cold/hotpack [] Iontophoresis   [x] Instruction in HEP      [x] Vasopneumatic   [] Dry Needling    [x] Manual Therapy               [] Aquatic Therapy              Electronically signed by:  Elva Walker PTA  , 3/7/2022, 11:23 AM

## 2022-03-09 ENCOUNTER — HOSPITAL ENCOUNTER (OUTPATIENT)
Dept: PHYSICAL THERAPY | Age: 62
Setting detail: THERAPIES SERIES
Discharge: HOME | End: 2022-03-09
Payer: COMMERCIAL

## 2022-03-09 PROCEDURE — 97110 THERAPEUTIC EXERCISES: CPT

## 2022-03-09 NOTE — FLOWSHEET NOTE
Outpatient Physical Therapy  [place]           [] Phone: [phone]   Fax: [phone]  [place] [facility]           [x] Phone: [phone]   Fax: [phone]        Physical Therapy Daily Treatment Note  Date:  3/9/2022    Patient Name:  Surinder Lisa    :  1960  MRN: [id number]  Restrictions/Precautions:    Diagnosis:   Diagnosis: L ankle  sprain  Date of Injury/Surgery:   Treatment Diagnosis: Treatment Diagnosis: L ankle pain    Insurance/Certification information: PT Insurance Information: [facility] 2 x/wk x 6 weeks till 3/27/22   Referring Physician:  Referring Practitioner: Yodit Shepherd Doctor Visit:    Plan of care signed (Y/N):    Outcome Measure: TUG 16 sec/LEFS   Visit# / total visits:   Pain level: 0/10 L ankle  Goals:     Patient goals : regain PLOF     Long term goals  Time Frame for Long term goals : 6 weeks 3/27/22  Long term goal 1: patient will return to work wihout restrictions      ASSESSMENT  Patient primary complaints:  L ankle pain    History of condition: L ankle  sprain(inversion) 1/3/22 @ work - 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 then crutches x 1 week - now in  boot, currently off work  Current functional limitations: off work -unable to accommodate work restrictions; wearing walking boot  Clinical findings:TUG 16 sec/LEFS ; L ankle swollen  PLOF:Pt was independent with ADLs/IADLs without significant pain or difficulties prior to ankle injury  Skilled PT interventions are intended to:  Decrease pain and restore mobility which will enable patient  to return to PLOF  Patient agrees with established plan of care and assisted in the development of their  goals  Barriers to learning:none- no mental/cognitive barriers observed  Preferred learning style(s):   written- demonstration -practice  Preferred Language: English  Potential barriers to progress:none  The patient appears motivated to participate in PT and regain PLOF: yes      Subjective:  Patient states that he had a little bit of pain when walking around at work last night. Not really having any pain currently. Any changes in Ambulatory Summary Sheet? None        Objective:   Fingertip assist to maintain balance with rockerboard. COVID screening questions were asked and patient attested that there had been no contact or symptoms        Exercises: (No more than 4 columns)   Exercise/Equipment 2/28/22 3/7/22 3/9/2022           WARM UP      Nustep    S12/A12 Lv5 10' S12/A12 Lv5 10' S12/A12 Lv5 10'         TABLE      Towel stretch Slant board 1.5' Slant board 1.5' Slant board 1.5 min    Rocker board Rocker board taps and balance both ways 2' for all Rocker board taps and balance both ways 2' for all Rocker board taps and balance both ways 2' for all   STANDING      On aeromat EC 2x30\" EC 2x30\" EC 2x30\"   BAPS all 4 direcs Lv2CW x 2' Standing Lv2CW x 2' Standing 2 min ea way   Tandem stance 2x30\" standing on aeromat 2x30\" ea, standing on aeromat 2x30\" ea, standing on aeromat   walkouts Holding handle 10# lateral walk R/L x 5 reps ea Holding handle 10# lateral walk R/L x 5 reps ea Holding handle 10# lateral walk R/L x 5 reps ea   STS From chair x 30 sec From chair x 30 sec (10 reps) From Chair x30\" 11 reps   SL balance x30 sec as able  x30 sec as able  30\"   Heel ups L single leg x 10 L single leg 2x10 L single leg 2x10                    PROPRIOCEPTION                                    MODALITIES                      Other Therapeutic Activities/Education:  Continue as instructed. Home Exercise Program:  Ankle AROM/ towel stretch/ alphabet/ ball roll       Manual Treatments:        Modalities:        Communication with other providers:        Assessment:  (Response towards treatment session) (Pain Rating) 0/10 pain at the end of treatment. Continues to struggle with ankle circles on the [employer].      Plan for Next Session:  Continue to progress strengthand balance as able      Time In / Time Out: 1117/1204       If [facility] Please Indicate Time In/Out/Total Time  CPT Code Time in Time out Total Time          803982  3301 0633 58'   VASO [id number]                                       Total for session      47'       Timed Code/Total Treatment Minutes:          Next Progress Note due:        Plan of Care Interventions:  [x] Therapeutic Exercise  [] Modalities:  [x] Therapeutic Activity     [] Ultrasound  [] Estim  [x] Gait Training      [] Cervical Traction [] Lumbar Traction  [x] Neuromuscular Re-education    [] Cold/hotpack [] Iontophoresis   [x] Instruction in HEP      [x] Vasopneumatic   [] Dry Needling    [x] Manual Therapy               [] Aquatic Therapy              Electronically signed by:  Emmie Hamlin PTA  , 3/9/2022, 11:16 AM

## 2022-03-14 ENCOUNTER — HOSPITAL ENCOUNTER (OUTPATIENT)
Dept: PHYSICAL THERAPY | Age: 62
Setting detail: THERAPIES SERIES
Discharge: HOME | End: 2022-03-14
Payer: COMMERCIAL

## 2022-03-14 PROCEDURE — 97110 THERAPEUTIC EXERCISES: CPT

## 2022-03-14 NOTE — FLOWSHEET NOTE
Outpatient Physical Therapy  [place]           [] Phone: [phone]   Fax: [phone]  [facility]           [x] Phone: [phone]   Fax: [phone]        Physical Therapy Daily Treatment Note  Date:  3/14/2022    Patient Name:  Royr Box    :  1960  MRN: [id number]  Restrictions/Precautions:    Diagnosis:   Diagnosis: L ankle  sprain  Date of Injury/Surgery:   Treatment Diagnosis: Treatment Diagnosis: L ankle pain    Insurance/Certification information: PT Insurance Information: BWC 2 x/wk x 6 weeks till 3/27/22   Referring Physician:  Referring Practitioner: Lnonie Shepherd Doctor Visit:    Plan of care signed (Y/N):    Outcome Measure: TUG 16 sec/LEFS   Visit# / total visits: 10/12  Pain level: 1/10 L ankle  Goals:     Patient goals : regain PLOF     Long term goals  Time Frame for Long term goals : 6 weeks 3/27/22  Long term goal 1: patient will return to work wihout restrictions      ASSESSMENT  Patient primary complaints:  L ankle pain    History of condition: L ankle  sprain(inversion) 1/3/22 @ work - 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 then crutches x 1 week - now in  boot, currently off work  Current functional limitations: off work -unable to accommodate work restrictions; wearing walking boot  Clinical findings:TUG 16 sec/LEFS ; L ankle swollen  PLOF:Pt was independent with ADLs/IADLs without significant pain or difficulties prior to ankle injury  Skilled PT interventions are intended to:  Decrease pain and restore mobility which will enable patient  to return to PLOF  Patient agrees with established plan of care and assisted in the development of their  goals  Barriers to learning:none- no mental/cognitive barriers observed  Preferred learning style(s):   written- demonstration -practice  Preferred Language: English  Potential barriers to progress:none  The patient appears motivated to participate in PT and regain PLOF: yes      Subjective:  Patient states that his ankle just started hurting a little bit ago at work. Rates his pain 1/10. Still working with restrictions. Any changes in Ambulatory Summary Sheet? None        Objective:   Constant ankle strategies to maintain balance on blue foam  COVID screening questions were asked and patient attested that there had been no contact or symptoms        Exercises: (No more than 4 columns)   Exercise/Equipment 2/28/22 3/7/22 3/9/2022 3/14/2022            WARM UP       Nustep    S12/A12 Lv5 10' S12/A12 Lv5 10' S12/A12 Lv5 10' S12/A12 Lv5 10'          TABLE       Towel stretch Slant board 1.5' Slant board 1.5' Slant board 1.5 min  Slant board 1.5 min    Rocker board Rocker board taps and balance both ways 2' for all Rocker board taps and balance both ways 2' for all Rocker board taps and balance both ways 2' for all Rocker board taps and balance both ways 2' for all   STANDING       On aeromat EC 2x30\" EC 2x30\" EC 2x30\" EC 2x30\"   BAPS all 4 direcs Lv2CW x 2' Standing Lv2CW x 2' Standing 2 min ea way Standing 2 min ea way   Tandem stance 2x30\" standing on aeromat 2x30\" ea, standing on aeromat 2x30\" ea, standing on aeromat 2x30\" ea, standing on aeromat   walkouts Holding handle 10# lateral walk R/L x 5 reps ea Holding handle 10# lateral walk R/L x 5 reps ea Holding handle 10# lateral walk R/L x 5 reps ea Holding handle 10# lateral walk R/L x 8 reps ea   STS From chair x 30 sec From chair x 30 sec (10 reps) From Chair x30\" 11 reps From Chair x30\" 11.5 reps   SL balance x30 sec as able  x30 sec as able  30\" 30\"x2   Heel ups L single leg x 10 L single leg 2x10 L single leg 2x10 L single leg 2x10                      PROPRIOCEPTION                                          MODALITIES                         Other Therapeutic Activities/Education:  Continue as instructed.       Home Exercise Program:  Ankle AROM/ towel stretch/ alphabet/ ball roll       Manual Treatments:        Modalities:        Communication with other providers:        Assessment: (Response towards treatment session) (Pain Rating) 0/10 pain at the end of treatment.   Continues to demonstrate difficulty maintaining balance on blue foam.      Plan for Next Session:  Continue to progress strength and balance as able      Time In / Time Out: 1037/1124       If [facility] Please Indicate Time In/Out/Total Time  CPT Code Time in Time out Total Time          922569  1824 4242 69   VASO 92777                                       Total for session      47'       Timed Code/Total Treatment Minutes:    52'      Next Progress Note due:        Plan of Care Interventions:  [x] Therapeutic Exercise  [] Modalities:  [x] Therapeutic Activity     [] Ultrasound  [] Estim  [x] Gait Training      [] Cervical Traction [] Lumbar Traction  [x] Neuromuscular Re-education    [] Cold/hotpack [] Iontophoresis   [x] Instruction in HEP      [x] Vasopneumatic   [] Dry Needling    [x] Manual Therapy               [] Aquatic Therapy              Electronically signed by:  Deja Lion PTA  , 3/14/2022, 10:37 AM

## 2022-03-16 ENCOUNTER — HOSPITAL ENCOUNTER (OUTPATIENT)
Dept: PHYSICAL THERAPY | Age: 62
Setting detail: THERAPIES SERIES
Discharge: HOME | End: 2022-03-16
Payer: COMMERCIAL

## 2022-03-16 PROCEDURE — 97110 THERAPEUTIC EXERCISES: CPT

## 2022-03-16 NOTE — FLOWSHEET NOTE
Outpatient Physical Therapy  [place]           [] Phone: [phone]   Fax: [phone]  [place] [facility]           [x] Phone: [phone]   Fax: [phone]        Physical Therapy Daily Treatment Note  Date:  3/16/2022    Patient Name:  Rubio Levine    :  1960  MRN: [id number]  Restrictions/Precautions:    Diagnosis:   Diagnosis: L ankle  sprain  Date of Injury/Surgery:   Treatment Diagnosis: Treatment Diagnosis: L ankle pain    Insurance/Certification information: PT Insurance Information: BWC 2 x/wk x 6 weeks till 3/27/22   Referring Physician:  Referring Practitioner: Simone White Doctor Visit:    Plan of care signed (Y/N):    Outcome Measure: TUG 16 sec/LEFS   Visit# / total visits:   Pain level: 0/10 L ankle  Goals:     Patient goals : regain PLOF     Long term goals  Time Frame for Long term goals : 6 weeks 3/27/22  Long term goal 1: patient will return to work wihout restrictions      ASSESSMENT  Patient primary complaints:  L ankle pain    History of condition: L ankle  sprain(inversion) 1/3/22 @ work - 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 then crutches x 1 week - now in  boot, currently off work  Current functional limitations: off work -unable to accommodate work restrictions; wearing walking boot  Clinical findings:TUG 16 sec/LEFS ; L ankle swollen  PLOF:Pt was independent with ADLs/IADLs without significant pain or difficulties prior to ankle injury  Skilled PT interventions are intended to:  Decrease pain and restore mobility which will enable patient  to return to PLOF  Patient agrees with established plan of care and assisted in the development of their  goals  Barriers to learning:none- no mental/cognitive barriers observed  Preferred learning style(s):   written- demonstration -practice  Preferred Language: English  Potential barriers to progress:none  The patient appears motivated to participate in PT and regain PLOF: yes      Subjective:  Patient denies any ankle pain currently. 0/10 pain at the end of treatment.       Plan for Next Session:  Continue to progress strength and balance as able      Time In / Time Out: 1119/1204     If [facility] Please Indicate Time In/Out/Total Time  CPT Code Time in Time out Total Time          762716  5745 0971 81   VASO 87896                                       Total for session      45'       Timed Code/Total Treatment Minutes:    39'  TE      Next Progress Note due:        Plan of Care Interventions:  [x] Therapeutic Exercise  [] Modalities:  [x] Therapeutic Activity     [] Ultrasound  [] Estim  [x] Gait Training      [] Cervical Traction [] Lumbar Traction  [x] Neuromuscular Re-education    [] Cold/hotpack [] Iontophoresis   [x] Instruction in HEP      [x] Vasopneumatic   [] Dry Needling    [x] Manual Therapy               [] Aquatic Therapy              Electronically signed by:  Serge Dean PTA  , 3/16/2022, 11:17 AM

## 2022-03-21 ENCOUNTER — HOSPITAL ENCOUNTER (OUTPATIENT)
Dept: PHYSICAL THERAPY | Age: 62
Setting detail: THERAPIES SERIES
Discharge: HOME | End: 2022-03-21
Payer: COMMERCIAL

## 2022-03-21 PROCEDURE — 97110 THERAPEUTIC EXERCISES: CPT

## 2022-03-21 NOTE — FLOWSHEET NOTE
Outpatient Physical Therapy  [place]           [] Phone: [phone]   Fax: [phone]  [place] [facility]           [x] Phone: [phone]   Fax: [phone]        Physical Therapy Daily Treatment Note  Date:  3/21/2022    Patient Name:  Leon Castro    :  1960  MRN: [id number]  Restrictions/Precautions:    Diagnosis:   Diagnosis: L ankle  sprain  Date of Injury/Surgery:   Treatment Diagnosis: Treatment Diagnosis: L ankle pain    Insurance/Certification information: PT Insurance Information: BWC 2 x/wk x 6 weeks till 3/27/22   Referring Physician:  Referring Practitioner: Nakia Shepherd Doctor Visit:    Plan of care signed (Y/N):    Outcome Measure: TUG 16 sec/LEFS   Visit# / total visits:   Pain level: 0/10 L ankle  Goals:     Patient goals : regain PLOF     Long term goals  Time Frame for Long term goals : 6 weeks 3/27/22  Long term goal 1: patient will return to work wihout restrictions      ASSESSMENT  Patient primary complaints:  L ankle pain    History of condition: L ankle  sprain(inversion) 1/3/22 @ work - 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 then crutches x 1 week - now in  boot, currently off work  Current functional limitations: off work -unable to accommodate work restrictions; wearing walking boot  Clinical findings:TUG 16 sec/LEFS ; L ankle swollen  PLOF:Pt was independent with ADLs/IADLs without significant pain or difficulties prior to ankle injury  Skilled PT interventions are intended to:  Decrease pain and restore mobility which will enable patient  to return to PLOF  Patient agrees with established plan of care and assisted in the development of their  goals  Barriers to learning:none- no mental/cognitive barriers observed  Preferred learning style(s):   written- demonstration -practice  Preferred Language: English  Potential barriers to progress:none  The patient appears motivated to participate in PT and regain PLOF: yes      Subjective:  Patient denies any ankle pain currently. Did not have any pain while at work. Any changes in Ambulatory Summary Sheet? None        Objective:  TUG 8 sec; LEFS 70/80  COVID screening questions were asked and patient attested that there had been no contact or symptoms        Exercises: (No more than 4 columns)   Exercise/Equipment 3/9/2022 3/14/2022 3/16/2022 3/21/22            WARM UP       Nustep    S12/A12 Lv5 10' S12/A12 Lv5 10' S12/A12 Lv5 10' S12/A12 Lv5 10'          TABLE       Towel stretch Slant board 1.5 min  Slant board 1.5 min  Slant board 1.5 min  Slant board 1.5 min    Rocker board Rocker board taps and balance both ways 2' for all Rocker board taps and balance both ways 2' for all Rocker board taps and balance both ways 2' for all Rocker board taps FWD/BWD and balance sagital plane x 1.5 min ea   STANDING       On aeromat EC 2x30\" EC 2x30\" EC 2x30\" ---   BAPS all 4 direcs Standing 2 min ea way Standing 2 min ea way Standing 2 min ea way    Tandem stance 2x30\" ea, standing on aeromat 2x30\" ea, standing on aeromat 2x30\" ea, standing on aeromat Tandem walk x 3 laps   walkouts Holding handle 10# lateral walk R/L x 5 reps ea Holding handle 10# lateral walk R/L x 8 reps ea Holding handle 10# lateral walk R/L x 8 reps ea Holding handle 10# lateral walk R/L x5reps ea; backward walking wearing blet 20# x10 reps   STS From Chair x30\" 11 reps From Chair x30\" 11.5 reps  -----   SL balance 30\" 30\"x2 30\"x2 30\"x2   Heel ups L single leg 2x10 L single leg 2x10 L single leg 2x10 L single leg 3x10   stairs                   PROPRIOCEPTION                                          MODALITIES                         Other Therapeutic Activities/Education:  Continue as instructed. Home Exercise Program:  Ankle AROM/ towel stretch/ alphabet/ ball roll       Manual Treatments:        Modalities:        Communication with other providers:        Assessment:  (Response towards treatment session) (Pain Rating) 0/10 pain at the end of treatment.       Plan for Next Session: Discharge from PT- goals of PT met      Time In / Time Out 1118  If [facility] Please Indicate Time In/Out/Total Time  CPT Code Time in Time out Total Time          427554  7633 9311 78   VASO 53233                                       Total for session      38'       Timed Code/Total Treatment Minutes:         Next Progress Note due:        Plan of Care Interventions:  [x] Therapeutic Exercise  [] Modalities:  [x] Therapeutic Activity     [] Ultrasound  [] Estim  [x] Gait Training      [] Cervical Traction [] Lumbar Traction  [x] Neuromuscular Re-education    [] Cold/hotpack [] Iontophoresis   [x] Instruction in HEP      [x] Vasopneumatic   [] Dry Needling    [x] Manual Therapy               [] Aquatic Therapy              Electronically signed by:  Kendall Chapin PT  , 3/21/2022, 11:17 AM

## 2022-03-22 NOTE — DISCHARGE SUMMARY
Outpatient Physical Therapy           [place]           [] Phone: [phone]   Fax: [phone]  [place]           [x] Phone: [phone]   Fax: [phone]        To: Referring Practitioner: Socorro Oppenheim                                        From: Jameson Hoover PT, PT       Patient: Sedrick Mooney                                                       : 1960  Diagnosis: Diagnosis: L ankle  sprain             Treatment Diagnosis: Treatment Diagnosis: L ankle pain   []  Progress Note                [x]  Discharge Note    Evaluation Date:  22   Total Visits to date:   15 planned for 12 Cancels/No-shows to date:      Subjective:  3/21/22 Patient denies any ankle pain currently. Did not have any pain while at work. Plan of Care/Treatment to date:  [x] Therapeutic Exercise    [] Modalities:  [x] Therapeutic Activity     [] Ultrasound  [] Electrical Stimulation  [x] Gait Training      [] Cervical Traction   [] Lumbar Traction  [x] Neuromuscular Re-education  [] Cold/hotpack [] Iontophoresis  [x] Instruction in HEP      Other:  [x] Manual Therapy       [x]  Vasopneumatic  [] Aquatic Therapy       []   Dry Needle Therapy                      Objective/Significant Findings At Last Visit/Comments:    TUG 8 sec; LEFS 70/80    Assessment:   @ eval TUG 16 sec/LEFS 28/80      Goal Status:  [] Achieved [x] Partially Achieved  [] Not Achieved   Long term goal 1: patient will return to work wihout restrictions- currently on restrictions   [x] Patient now discharged      Electronically signed by:  Jameson Hoover PT,, 3/22/2022, 11:05 AM    If you have any questions or concerns, please don't hesitate to call.   Thank you for your referral.

## 2022-04-05 ENCOUNTER — OFFICE VISIT (OUTPATIENT)
Dept: ORTHOPEDIC SURGERY | Age: 62
End: 2022-04-05
Payer: COMMERCIAL

## 2022-04-05 VITALS
HEIGHT: 69 IN | OXYGEN SATURATION: 98 % | HEART RATE: 88 BPM | BODY MASS INDEX: 29.62 KG/M2 | RESPIRATION RATE: 19 BRPM | WEIGHT: 200 LBS

## 2022-04-05 DIAGNOSIS — S93.492D SPRAIN OF ANTERIOR TALOFIBULAR LIGAMENT OF LEFT ANKLE, SUBSEQUENT ENCOUNTER: Primary | ICD-10-CM

## 2022-04-05 PROCEDURE — 99213 OFFICE O/P EST LOW 20 MIN: CPT | Performed by: ORTHOPAEDIC SURGERY

## 2022-04-05 NOTE — PROGRESS NOTES
Patient returns to the office with a left ankle injury, [facility]. Pt stated that he is overall doing well today and would rate his pain about a 1/10 today. Pt stated that he has issues with swelling on occasion but can usually be treated with elevating and his compression sleeve. Pt stated he is still on light duty restrictions at work and feels he is ready for full duty.

## 2022-04-05 NOTE — PROGRESS NOTES
4/5/2022   Chief Complaint   Patient presents with    Ankle Pain     Left, [facility]        History of Present Illness:                             Yanci Saavedra is a 65 y.o. male who returns today for follow-up of his left ankle. He is noticed improvement in his function and pain. He has been doing well with activities and therapy exercises. He uses a compression sleeve for support. He would like to return to regular duties at work. Patient returns to the office with a left ankle injury, [facility]. Pt stated that he is overall doing well today and would rate his pain about a 1/10 today. Pt stated that he has issues with swelling on occasion but can usually be treated with elevating and his compression sleeve. Pt stated he is still on light duty restrictions at work and feels he is ready for full duty. Medical History  Patient's medications, allergies, past medical, surgical, social and family histories were reviewed and updated as appropriate. Review of Systems   Constitutional: Negative for activity change and fever. Respiratory: Negative for chest tightness and shortness of breath. Cardiovascular: Negative for chest pain. Skin: Negative for color change. Neurological: Negative for dizziness. Psychiatric/Behavioral: The patient is not nervous/anxious. Examination:  General Exam:  Vitals: Pulse 88   Resp 19   Ht 5' 9\" (1.753 m)   Wt 200 lb (90.7 kg)   SpO2 98%   BMI 29.53 kg/m²    Physical Exam       Left lower extremity:  No tenderness to palpation at the ankle joint or malleoli. Ankle is stable to stress examination and anterior drawer testing. Sensation motor function is intact throughout. Improved range of motion with mild restrictions at the end of dorsiflexion and plantarflexion and inversion and eversion of the ankle.   Mild soft tissue swelling diffusely throughout the ankle with no joint effusion present    Office Procedures:  No orders of the defined types were placed in this encounter. Assessment and Plan  1. Left ankle sprain    He has made good progress with his recovery. His ankle has responded nicely to compressive wraps and therapy exercises. I have given him a note to return to work without restrictions. Continue supportive shoes and wraps as needed. Follow-up as needed if any concerns or questions.         Electronically signed by Khris Canales MD on 4/5/2022 at 2:28 PM

## 2022-04-05 NOTE — LETTER
[facility]  [place] [address]  Phone: [phone]    Nidhi Dewey MD        April 5, 2022     Patient: Juli Lara   YOB: 1960   Date of Visit: 4/5/2022       To Whom It May Concern: It is my medical opinion that Beata Montanez may return to full duty work with no restrictions. If you have any questions or concerns, please don't hesitate to call.     Sincerely,        Nidhi Dewey MD

## 2022-04-05 NOTE — PATIENT INSTRUCTIONS
Continue weight-bearing as tolerated. Continue range of motion exercises as instructed. Ice and elevate as needed. Tylenol or Motrin for pain. May return to full duty work with no restrictions. Follow up as needed.

## 2022-04-06 PROBLEM — S93.492A SPRAIN OF ANTERIOR TALOFIBULAR LIGAMENT OF LEFT ANKLE: Status: ACTIVE | Noted: 2022-04-06

## 2022-04-06 ASSESSMENT — ENCOUNTER SYMPTOMS
CHEST TIGHTNESS: 0
SHORTNESS OF BREATH: 0
COLOR CHANGE: 0

## 2022-05-01 NOTE — PROGRESS NOTES
Patient Name: Ashlee Davis  Patient : 1960  Patient MRN: [id number]     Primary Oncologist: Amee Bueno MD  Referring Provider: Carolina Jimenes MD     Date of Service: 2022      Chief Complaint:   Chief Complaint   Patient presents with    Follow-up    Results     Patient Active Problem List:     Polycythemia, secondary     Diabetes mellitus without complication ([facility] [address]    HPI:   Mr. Jed Nunez ( [birth year]) was diagnosed with JAK2 gene mutation positive Polycythemia vera in 2013. He had an uncontrolled bleeding after tooth extraction. Found elevated counts. W/U including H/H elevated in the 21/60 range and BRIGHT-2 gene mutation positive 43.4 percent cells confirmed Dx of Polycythemia Vera. No evidence of bcr/abl 1 translocation by FISH. He was started on phlebotomies and felt much better after a couple of phlebotomies and his hemoglobin dropped down from 22 to 16 and hematocrit from 64 to 47. Platelet counts are hovering around 600- 700,000 per cubic millimeter. Thus he was also started on Hydrea in 2014. Trying to maintain Platelet counts close to normal range. Need for Phlebotomies has gone down to every 2-3 months    On May 3, 2022, he presented to me for followup. I have been following Mr. Jed Nunez for JAK2  positive polycythemia vera and he has been on therapeutic phlebotomies as needed. He stated that his general sense of wellbeing is getting better with the phlebotomy. He is tolerating the phlebotomy well and he does not encounter any major side effects from it. Since his hematocrit on 22 was 45.2, he had a phlebotomy on 2/15/22. I will try to keep his hematocrit less than 45%. He is also on hydroxyurea 500 mg daily for his thrombocytosis and he is tolerating hydroxyurea well. Since his platelet count today was within normal range, I recommend him to continue with current dose of hydroxyurea for now.     He is also on aspirin 162 mg daily and I recommend him to continue with that on regular basis to prevent JAK2 positive myeloproliferative neoplasm induced thromboembolic episodes. He doesn't have hepato-splenomegaly on physical examination. I will monitor his blood count every four month interval and will do phlebotomy as needed to keep his hematocrit less than 45%. He doesn't have any complaint on today visit. PAST MEDICAL HISTORY:  1. Hypertension since 2012, currently on lisinopril. 2. diabetes since , currently on Lantus insulin and Humalog. He was hospitalized for ketoacidosis in . 3. CKD, for which he is seeing Dr. Wiley Lim. 4. His P Rodríguez Lovelar was identified  in 2013.  5. Superficial phlebitis in  after he had laser surgery for varicose veins, especially on the left side. 6. History of migraines since .  7. He had left retinal hemorrhage ? detected by Dr Luke Lopez when he went for his Glaucoma f/up in 2014 and was sent to Dr Heriberto Finney and started on treatment  with Avastin for the same. His eye Sx have since improved. Pita Jacobs FAMILY HISTORY:  Father  at age 28 of lung cancer. Uncle Yasemin Soler) had some form of blood disorder and  in . He had a ruptured spleen and cancer at age 68. He has one sister, Terese Argueta, and the other one, Yisel Hough, is a Hospice nurse. PERSONAL HISTORY:  He is a nonsmoker, nondrinker. He lives with his wife, [employer], of 23 years. They have one daughter who is 23years old, studying at [employer]. 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 Oncology History    No history exists. Review of Systems: \"Per interval history; otherwise 10 point ROS is negative. \"  His energy level is good and his sleep is fine. He denies fever, chills, night sweats, cough, shortness of breath, chest pain, hemoptysis or palpitations.   His bowel and bladder functions are normal. He doesn't have nausea, vomiting, abdominal pain, diarrhea, constipation, dysuria, loss of appetite or weight loss. He denies neuropathy and he doesn't have bleeding or clotting issues. He denies any pain in his body. No anxiety or depression. The rest of the systems are unremarkable. Vital Signs:  BP (!) 154/73 (Site: Right Upper Arm, Position: Sitting, Cuff Size: Medium Adult)   Pulse 84   Temp 97 °F (36.1 °C) (Temporal)   Ht 5' 9\" (1.753 m)   Wt 204 lb 3.2 oz (92.6 kg)   SpO2 97%   BMI 30.16 kg/m²     Physical Exam:  CONSTITUTIONAL: awake, alert, cooperative, no apparent distress   EYES: pupils equal, round and reactive to light, sclera clear and conjunctiva normal  ENT: Normocephalic, without obvious abnormality, atraumatic  NECK: supple, symmetrical, no jugular venous distension and no carotid bruits   HEMATOLOGIC/LYMPHATIC: no cervical, supraclavicular or axillary lymphadenopathy   LUNGS: VBS, no wheezes, clear to auscultation, no rhonchi, no increased work of breathing, no crackles,   CARDIOVASCULAR: regular rate and rhythm, normal S1 and S2, no murmur noted  ABDOMEN: normal bowel sounds x 4, soft, non-distended, non-tender, no masses palpated, no hepatosplenomegaly   MUSCULOSKELETAL: full range of motion noted, tone is normal  NEUROLOGIC: awake, alert, oriented to name, place and time. Motor skills grossly intact. SKIN: Normal skin color, texture, turgor and no jaundice.  appears intact   EXTREMITIES: no LE edema, no cyanosis, no clubbing, no leg swelling,     Labs:  Hematology:  Lab Results   Component Value Date    WBC 5.0 02/02/2022    RBC 4.65 02/02/2022    HGB 16.3 02/02/2022    HCT 45.2 02/02/2022    MCV 97.2 02/02/2022    MCH 35.1 (H) 02/02/2022    MCHC 36.1 (H) 02/02/2022    RDW 13.4 02/02/2022     02/02/2022    MPV 9.7 02/02/2022    BANDSPCT 3 (L) 01/09/2015    SEGSPCT 68.4 (H) 11/02/2021    EOSRELPCT 1.8 02/02/2022    BASOPCT 1.0 02/02/2022    LYMPHOPCT 27.1 02/02/2022    MONOPCT 4.8 02/02/2022    BANDABS 0.25 01/09/2015    SEGSABS 4.1 11/02/2021    EOSABS 0.1 02/02/2022 BASOSABS 0.1 02/02/2022    LYMPHSABS 1.4 02/02/2022    MONOSABS 0.2 02/02/2022    DIFFTYPE AUTOMATIC METHOD 02/02/2022    [facility] RARE 01/09/2015     Lab Results   Component Value Date    ESR 4 02/02/2022     Chemistry:  Lab Results   Component Value Date     02/02/2022    K 4.4 02/02/2022    CL 99 02/02/2022    CO2 29 02/02/2022    BUN 15 02/02/2022    CREATININE 1.1 02/02/2022    GLUCOSE 338 (H) 02/02/2022    CALCIUM 9.2 02/02/2022    PROT 6.4 02/02/2022    LABALBU 4.3 02/02/2022    BILITOT 0.6 02/02/2022    ALKPHOS 158 (H) 02/02/2022    AST 23 02/02/2022    ALT 29 02/02/2022    LABGLOM >60 12/24/2021    GFRAA >60 12/24/2021    GLOB 2.1 02/02/2022     Lab Results   Component Value Date     02/02/2022     No components found for: LD  Lab Results   Component Value Date    TSHHS 1.780 12/24/2021     Immunology:  Lab Results   Component Value Date    PROT 6.4 02/02/2022    ALBUMINELP 3.9 10/10/2017    LABALPH 0.2 10/10/2017    LABALPH 0.6 10/10/2017    LABBETA 1.0 10/10/2017    GAMGLOB 0.9 10/10/2017     No results found for: Jonna Brood, KLFLCR  No results found for: B2M  Coagulation Panel:  Lab Results   Component Value Date    PROTIME 8.6 (L) 06/27/2013    INR 0.88 06/27/2013    APTT 30.0 06/27/2013    APTT  06/27/2013     CORRECTED ON 06/27 AT 0851: PREVIOUSLY REPORTED AS: 36.0     Anemia Panel:  No results found for: [id number], FOLATE  Tumor Markers:  Lab Results   Component Value Date    PSA 0.42 12/24/2021     Observations:  PHQ-9 Total Score: 0 (5/2/2022  1:46 PM)    Assessment & Plan:   JAK2 gene mutation positive Polycythemia vera    PLAN  Mr. Linnea Jain has been followed for JAK2  positive polycythemia vera. On May 3, 2022, he presented to me for followup. I have been following Mr. Linnea Jain for JAK2  positive polycythemia vera and he has been on therapeutic phlebotomies as needed. He stated that his general sense of wellbeing is getting better with the phlebotomy.   He is tolerating the phlebotomy well and he does not encounter any major side effects from it. Since his hematocrit on 2/2/22 was 45.2, he had a phlebotomy on 2/15/22. I will try to keep his hematocrit less than 45%. He is also on hydroxyurea 500 mg daily for his thrombocytosis and he is tolerating hydroxyurea well. Since his platelet count today was within normal range, I recommend him to continue with current dose of hydroxyurea for now. He is also on aspirin 162 mg daily and I recommend him to continue with that on regular basis to prevent JAK2 positive myeloproliferative neoplasm induced thromboembolic episodes. He doesn't have hepato-splenomegaly on physical examination. I will monitor his blood count every four month interval and will do phlebotomy as needed to keep his hematocrit less than 45%. I answered all his questions and concerns for today. I asked him to follow up with primary care physician on regular basis. Recent imaging and labs were reviewed and discussed with the patient.

## 2022-05-02 ENCOUNTER — HOSPITAL ENCOUNTER (OUTPATIENT)
Dept: INFUSION THERAPY | Age: 62
Discharge: HOME | End: 2022-05-02

## 2022-05-02 ENCOUNTER — OFFICE VISIT (OUTPATIENT)
Dept: ONCOLOGY | Age: 62
End: 2022-05-02
Payer: COMMERCIAL

## 2022-05-02 VITALS
OXYGEN SATURATION: 97 % | HEART RATE: 84 BPM | DIASTOLIC BLOOD PRESSURE: 73 MMHG | SYSTOLIC BLOOD PRESSURE: 154 MMHG | TEMPERATURE: 97 F | BODY MASS INDEX: 30.24 KG/M2 | WEIGHT: 204.2 LBS | HEIGHT: 69 IN

## 2022-05-02 DIAGNOSIS — D75.1 POLYCYTHEMIA, SECONDARY: Primary | ICD-10-CM

## 2022-05-02 PROCEDURE — 99213 OFFICE O/P EST LOW 20 MIN: CPT | Performed by: INTERNAL MEDICINE

## 2022-05-02 ASSESSMENT — PATIENT HEALTH QUESTIONNAIRE - PHQ9
SUM OF ALL RESPONSES TO PHQ9 QUESTIONS 1 & 2: 0
SUM OF ALL RESPONSES TO PHQ QUESTIONS 1-9: 0
1. LITTLE INTEREST OR PLEASURE IN DOING THINGS: 0
2. FEELING DOWN, DEPRESSED OR HOPELESS: 0
SUM OF ALL RESPONSES TO PHQ QUESTIONS 1-9: 0

## 2022-05-02 NOTE — PROGRESS NOTES
MA Rooming Questions  Patient: Chris Lee  MRN: [id number]    Date: 5/2/2022        1. Do you have any new issues?   no         2. Do you need any refills on medications?    no    3. Have you had any imaging done since your last visit? yes - 2/2    4. Have you been hospitalized or seen in the emergency room since your last visit here?   no    5. Did the patient have a depression screening completed today?  Yes    PHQ-9 Total Score: 0 (5/2/2022  1:46 PM)       PHQ-9 Given to (if applicable):               PHQ-9 Score (if applicable):                     [] Positive     []  Negative              Does question #9 need addressed (if applicable)                     [] Yes    []  No               Evangelina Carlisle CMA

## 2022-06-02 LAB
ABSOLUTE IMMATURE GRANULOCYTE: 0 K/UL (ref 0–0.1)
ALBUMIN/GLOBULIN RATIO: 2 RATIO (ref 0.8–2.6)
ALBUMIN: 4.6 G/DL (ref 3.5–5.2)
ALP BLD-CCNC: 143 U/L (ref 23–144)
ALT SERPL-CCNC: 28 U/L (ref 0–60)
AST SERPL-CCNC: 23 U/L (ref 0–55)
BASOPHILS ABSOLUTE: 0.1 K/UL (ref 0–0.3)
BASOPHILS RELATIVE PERCENT: 1.5 % (ref 0–2)
BILIRUB SERPL-MCNC: 0.6 MG/DL (ref 0–1.2)
BUN BLDV-MCNC: 15 MG/DL (ref 3–29)
BUN/CREAT BLD: 14 (ref 7–25)
CALCIUM SERPL-MCNC: 9 MG/DL (ref 8.5–10.5)
CHLORIDE BLD-SCNC: 106 MEQ/L (ref 96–110)
CO2: 26 MEQ/L (ref 19–32)
CREAT SERPL-MCNC: 1.1 MG/DL (ref 0.5–1.4)
DIFFERENTIAL TYPE: ABNORMAL
EOSINOPHILS ABSOLUTE: 0.1 K/UL (ref 0–0.5)
EOSINOPHILS RELATIVE PERCENT: 1.5 % (ref 0–5)
ERYTHROCYTE SEDIMENTATION RATE: 3 MM/HR (ref 0–20)
GLOBULIN: 2.3 G/DL (ref 1.9–3.6)
GLOMERULAR FILTRATION RATE: 76 MLS/MIN/1.73M2
GLUCOSE BLD-MCNC: 115 MG/DL (ref 70–99)
HCT VFR BLD CALC: 44.4 % (ref 37.5–51)
HEMOGLOBIN: 16.1 G/DL (ref 13–17.7)
IMMATURE GRANULOCYTES: 0.3 %
LYMPHOCYTES ABSOLUTE: 1.7 K/UL (ref 0.9–4.1)
LYMPHOCYTES RELATIVE PERCENT: 28.6 % (ref 14–51)
MCH RBC QN AUTO: 34.5 PG (ref 26–34)
MCHC RBC AUTO-ENTMCNC: 36.3 G/DL (ref 30.7–35.5)
MCV RBC AUTO: 95.1 FL (ref 80–100)
MONOCYTES ABSOLUTE: 0.4 K/UL (ref 0.2–1)
MONOCYTES RELATIVE PERCENT: 6.4 % (ref 4–12)
NEUTROPHILS ABSOLUTE: 3.6 K/UL (ref 1.8–7.5)
NEUTROPHILS RELATIVE PERCENT: 61.7 % (ref 42–80)
NUCLEATED RBCS: 0 /100 WBC
PDW BLD-RTO: 13.3 %
PLATELET # BLD: 363 K/UL (ref 140–400)
PMV BLD AUTO: 9.5 FL (ref 7.2–11.7)
POTASSIUM SERPL-SCNC: 3.9 MEQ/L (ref 3.4–5.3)
RBC # BLD: 4.67 M/UL (ref 4.14–5.8)
SODIUM BLD-SCNC: 141 MEQ/L (ref 135–148)
STATUS: ABNORMAL
TOTAL PROTEIN: 6.9 G/DL (ref 6–8.3)
WBC: 5.8 K/UL (ref 3.5–10.9)

## 2022-06-07 ENCOUNTER — TELEPHONE (OUTPATIENT)
Dept: INFUSION THERAPY | Age: 62
End: 2022-06-07

## 2022-06-07 NOTE — TELEPHONE ENCOUNTER
Sharlene Victor,  Please let him know that his Hematocrit is less than 45% and he doesn't need phlebotomy this time.    Thank you,  [place] Luther

## 2022-06-08 NOTE — TELEPHONE ENCOUNTER
Informed pt hematocrit level and that he does not need phlebotomy at this time per Dr. Mary Tobar. Pt voiced understanding.

## 2022-07-15 RX ORDER — HYDROXYUREA 500 MG/1
CAPSULE ORAL
Qty: 90 CAPSULE | Refills: 3 | Status: SHIPPED | OUTPATIENT
Start: 2022-07-15

## 2022-10-30 NOTE — PROGRESS NOTES
Patient Name: Lai Meehan  Patient : 1960  Patient MRN: [id number]     Primary Oncologist: Carolina Martinez MD  Referring Provider: Ally Bustillo MD     Date of Service: 2022      Chief Complaint:   Chief Complaint   Patient presents with    Follow-up     Patient Active Problem List:     Polycythemia, secondary     Diabetes mellitus without complication [facility])    HPI:   Mr. Aga Bach ( [birth year]) was diagnosed with JAK2 gene mutation positive Polycythemia vera in 2013. He had an uncontrolled bleeding after tooth extraction. Found elevated counts. W/U including H/H elevated in the 21/60 range and BRIGHT-2 gene mutation positive 43.4 percent cells confirmed Dx of Polycythemia Vera. No evidence of bcr/abl 1 translocation by FISH. He was started on phlebotomies and felt much better after a couple of phlebotomies and his hemoglobin dropped down from 22 to 16 and hematocrit from 64 to 47. Platelet counts are hovering around 600- 700,000 per cubic millimeter. Thus he was also started on Hydrea in 2014. Trying to maintain Platelet counts close to normal range. Need for Phlebotomies has gone down to every 2-3 months    On 2022, he presented to me for followup. I have been following Mr. Aga Bach for JAK2  positive polycythemia vera and he has been on therapeutic phlebotomies as needed. He stated that his general sense of wellbeing is getting better with the phlebotomy. He is tolerating the phlebotomy well and he does not encounter any major side effects from it. His last phlebotomy was on 2/15/22. I will check all his labs today and will follow up with the results. If his hematocrit is >45%, I will recommend for phlebotomy. I will try to keep his hematocrit less than 45%. He is also on hydroxyurea 500 mg daily for his thrombocytosis and he is tolerating hydroxyurea well. Will follow up with his platelet count.      Meanwhile, I recommend him to continue with current dose of hydroxyurea for now. He is also on aspirin 162 mg daily and I recommend him to continue with that on regular basis to prevent JAK2 positive myeloproliferative neoplasm induced thromboembolic episodes. He doesn't have hepato-splenomegaly on physical examination. I will monitor his blood count every 3 month interval and will do phlebotomy as needed to keep his hematocrit less than 45%. Will request US abdomen in 6 months to r/o hepatosplenomegaly. Hypertension - his SBP is mildly elevated. To continue with metoprolol XL 25 mg daily. Diabetes - he is on lantus insulin. Recommend ADA diet. Recommend him to have age appropriate cancer screening, exercise, low fat and low sodium diet. He doesn't have any complaint on today visit. PAST MEDICAL HISTORY:  1. Hypertension since 2012, currently on lisinopril. 2. diabetes since , currently on Lantus insulin and Humalog. He was hospitalized for ketoacidosis in . 3. CKD, for which he is seeing Dr. Shankar Colon. 4. His P Joann Anthony was identified  in 2013.  5. Superficial phlebitis in  after he had laser surgery for varicose veins, especially on the left side. 6. History of migraines since .  7. He had left retinal hemorrhage ? detected by Dr Princess Traylor when he went for his Glaucoma f/up in 2014 and was sent to Dr Chel Miller and started on treatment  with Avastin for the same. His eye Sx have since improved. [facility] FAMILY HISTORY:  Father  at age 28 of lung cancer. Uncle Rj Barahona) had some form of blood disorder and  in . He had a ruptured spleen and cancer at age 68. He has one sister, Abhijeet Zurita, and the other one, Lexy Rhodes, is a Hospice nurse. PERSONAL HISTORY:  He is a nonsmoker, nondrinker. He lives with his wife, Usman Van, of 23 years. They have one daughter who is 23years old, studying at [employer]. 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       Oncology History No history exists. Review of Systems: \"Per interval history; otherwise 10 point ROS is negative. \"  His energy level is stable and his sleep is fine. He denies fever, chills, night sweats, cough, shortness of breath, chest pain, hemoptysis or palpitations. His bowel and bladder functions are normal. He denies nausea, vomiting, abdominal pain, diarrhea, constipation, dysuria, loss of appetite or weight loss. He denies neuropathy and he doesn't have bleeding or clotting issues. He denies any pain in his body. No anxiety or depression. The rest of the systems are unremarkable. Vital Signs:  BP (!) 154/72 (Site: Right Upper Arm, Position: Sitting, Cuff Size: Medium Adult)   Pulse 74   Temp 97.4 °F (36.3 °C) (Infrared)   Ht 5' 9\" (1.753 m)   Wt 206 lb (93.4 kg)   SpO2 98%   BMI 30.42 kg/m²     Physical Exam:  CONSTITUTIONAL: awake, alert, cooperative, no apparent distress   EYES: pupils equal, round and reactive to light, sclera clear and conjunctiva normal  ENT: Normocephalic, without obvious abnormality, atraumatic  NECK: supple, symmetrical, no jugular venous distension and no carotid bruits   HEMATOLOGIC/LYMPHATIC: no cervical, supraclavicular or axillary lymphadenopathy   LUNGS: VBS, no wheezes, clear to auscultation, no rhonchi, no increased work of breathing, no crackles,   CARDIOVASCULAR: regular rate and rhythm, normal S1 and S2, no murmur noted  ABDOMEN: normal bowel sounds x 4, soft, non-distended, non-tender, no masses palpated, no hepatosplenomegaly   MUSCULOSKELETAL: full range of motion noted, tone is normal  NEUROLOGIC: awake, alert, oriented to name, place and time. Motor skills grossly intact. SKIN: Normal skin color, texture, turgor and no jaundice.  appears intact   EXTREMITIES: no cyanosis, no clubbing, no LE edema, no leg swelling,     Labs:  Hematology:  Lab Results   Component Value Date    WBC 5.8 06/02/2022    RBC 4.67 06/02/2022    HGB 16.1 06/02/2022    HCT 44.4 06/02/2022 MCV 95.1 06/02/2022    MCH 34.5 (H) 06/02/2022    MCHC 36.3 (H) 06/02/2022    RDW 13.3 06/02/2022     06/02/2022    MPV 9.5 06/02/2022    BANDSPCT 3 (L) 01/09/2015    SEGSPCT 68.4 (H) 11/02/2021    EOSRELPCT 1.5 06/02/2022    BASOPCT 1.5 06/02/2022    LYMPHOPCT 28.6 06/02/2022    MONOPCT 6.4 06/02/2022    BANDABS 0.25 01/09/2015    SEGSABS 4.1 11/02/2021    EOSABS 0.1 06/02/2022    BASOSABS 0.1 06/02/2022    LYMPHSABS 1.7 06/02/2022    MONOSABS 0.4 06/02/2022    DIFFTYPE AUTOMATIC METHOD 06/02/2022    [facility] RARE 01/09/2015     Lab Results   Component Value Date    ESR 3 06/02/2022     Chemistry:  Lab Results   Component Value Date     06/02/2022    K 3.9 06/02/2022     06/02/2022    CO2 26 06/02/2022    BUN 15 06/02/2022    CREATININE 1.1 06/02/2022    GLUCOSE 115 (H) 06/02/2022    CALCIUM 9.0 06/02/2022    PROT 6.9 06/02/2022    LABALBU 4.6 06/02/2022    BILITOT 0.6 06/02/2022    ALKPHOS 143 06/02/2022    AST 23 06/02/2022    ALT 28 06/02/2022    LABGLOM >60 12/24/2021    GFRAA >60 12/24/2021    GLOB 2.3 06/02/2022     Lab Results   Component Value Date     02/02/2022     No components found for: LD  Lab Results   Component Value Date    TSHHS 1.780 12/24/2021     Immunology:  Lab Results   Component Value Date    PROT 6.9 06/02/2022    ALBUMINELP 3.9 10/10/2017    LABALPH 0.2 10/10/2017    LABALPH 0.6 10/10/2017    LABBETA 1.0 10/10/2017    GAMGLOB 0.9 10/10/2017     No results found for: Femi Plunkett, KLFLCR  No results found for: B2M  Coagulation Panel:  Lab Results   Component Value Date    PROTIME 8.6 (L) 06/27/2013    INR 0.88 06/27/2013    APTT 30.0 06/27/2013    APTT  06/27/2013     CORRECTED ON 06/27 AT 0851: PREVIOUSLY REPORTED AS: 36.0     Anemia Panel:  No results found for: [id number], FOLATE  Tumor Markers:  Lab Results   Component Value Date    PSA 0.42 12/24/2021     Observations:  PHQ-9 Total Score: 0 (11/2/2022  1:01 PM)    Assessment:   JAK2 gene mutation positive Polycythemia vera    Plan:  Mr. Anthony Eason has been followed for JAK2  positive polycythemia vera. On November 2, 2022, he presented to me for followup. I have been following Mr. Anthony Eason for JAK2  positive polycythemia vera and he has been on therapeutic phlebotomies as needed. He stated that his general sense of wellbeing is getting better with the phlebotomy. He is tolerating the phlebotomy well and he does not encounter any major side effects from it. His last phlebotomy was on 2/15/22. I will check all his labs today and will follow up with the results. If his hematocrit is >45%, I will recommend for phlebotomy. I will try to keep his hematocrit less than 45%. He is also on hydroxyurea 500 mg daily for his thrombocytosis and he is tolerating hydroxyurea well. Will follow up with his platelet count. Meanwhile, I recommend him to continue with current dose of hydroxyurea for now. He is also on aspirin 162 mg daily and I recommend him to continue with that on regular basis to prevent JAK2 positive myeloproliferative neoplasm induced thromboembolic episodes. He doesn't have hepato-splenomegaly on physical examination. I will monitor his blood count every 3 month interval and will do phlebotomy as needed to keep his hematocrit less than 45%. Will request US abdomen in 6 months to r/o hepatosplenomegaly. Hypertension - his SBP is mildly elevated. To continue with metoprolol XL 25 mg daily. Diabetes - he is on lantus insulin. Recommend ADA diet. Recommend him to have age appropriate cancer screening, exercise, low fat and low sodium diet. I answered all his questions and concerns for today. Recent imaging and labs were reviewed and discussed with the patient.

## 2022-11-02 ENCOUNTER — HOSPITAL ENCOUNTER (OUTPATIENT)
Dept: INFUSION THERAPY | Age: 62
Discharge: HOME | End: 2022-11-02
Payer: COMMERCIAL

## 2022-11-02 ENCOUNTER — OFFICE VISIT (OUTPATIENT)
Dept: ONCOLOGY | Age: 62
End: 2022-11-02
Payer: COMMERCIAL

## 2022-11-02 VITALS
TEMPERATURE: 97.4 F | HEIGHT: 69 IN | DIASTOLIC BLOOD PRESSURE: 72 MMHG | BODY MASS INDEX: 30.51 KG/M2 | HEART RATE: 74 BPM | OXYGEN SATURATION: 98 % | SYSTOLIC BLOOD PRESSURE: 154 MMHG | WEIGHT: 206 LBS

## 2022-11-02 DIAGNOSIS — D45 PRIMARY POLYCYTHEMIA (HCC): Primary | ICD-10-CM

## 2022-11-02 LAB
ABSOLUTE IMMATURE GRANULOCYTE: 0 K/UL (ref 0–0.1)
ALBUMIN/GLOBULIN RATIO: 2.2 RATIO (ref 0.8–2.6)
ALBUMIN: 4.7 G/DL (ref 3.5–5.2)
ALP BLD-CCNC: 137 U/L (ref 23–144)
ALT SERPL-CCNC: 32 U/L (ref 0–60)
AST SERPL-CCNC: 29 U/L (ref 0–55)
BASOPHILS ABSOLUTE: 0.1 K/UL (ref 0–0.3)
BASOPHILS RELATIVE PERCENT: 1 % (ref 0–2)
BILIRUB SERPL-MCNC: 0.7 MG/DL (ref 0–1.2)
BUN BLDV-MCNC: 13 MG/DL (ref 3–29)
BUN/CREAT BLD: 13 (ref 7–25)
CALCIUM SERPL-MCNC: 9.6 MG/DL (ref 8.5–10.5)
CHLORIDE BLD-SCNC: 101 MEQ/L (ref 96–110)
CO2: 28 MEQ/L (ref 19–32)
CREAT SERPL-MCNC: 1 MG/DL (ref 0.5–1.4)
DIFFERENTIAL TYPE: ABNORMAL
EOSINOPHILS ABSOLUTE: 0.1 K/UL (ref 0–0.5)
EOSINOPHILS RELATIVE PERCENT: 1.2 % (ref 0–5)
ERYTHROCYTE SEDIMENTATION RATE: 3 MM/HR (ref 0–20)
GLOBULIN: 2.1 G/DL (ref 1.9–3.6)
GLOMERULAR FILTRATION RATE: 86 MLS/MIN/1.73M2
GLUCOSE BLD-MCNC: 173 MG/DL (ref 70–99)
HCT VFR BLD CALC: 45.7 % (ref 37.5–51)
HEMOGLOBIN: 16.3 G/DL (ref 13–17.7)
IMMATURE GRANULOCYTES: 0.3 %
LDH: 199 U/L (ref 0–260)
LYMPHOCYTES ABSOLUTE: 1.5 K/UL (ref 0.9–4.1)
LYMPHOCYTES RELATIVE PERCENT: 26.4 % (ref 14–51)
MCH RBC QN AUTO: 34.9 PG (ref 26–34)
MCHC RBC AUTO-ENTMCNC: 35.7 G/DL (ref 30.7–35.5)
MCV RBC AUTO: 97.9 FL (ref 80–100)
MONOCYTES ABSOLUTE: 0.3 K/UL (ref 0.2–1)
MONOCYTES RELATIVE PERCENT: 5.9 % (ref 4–12)
NEUTROPHILS ABSOLUTE: 3.8 K/UL (ref 1.8–7.5)
NEUTROPHILS RELATIVE PERCENT: 65.2 % (ref 42–80)
NUCLEATED RBCS: 0 /100 WBC
PDW BLD-RTO: 13.6 %
PLATELET # BLD: 365 K/UL (ref 140–400)
PMV BLD AUTO: 9 FL (ref 7.2–11.7)
POTASSIUM SERPL-SCNC: 4.2 MEQ/L (ref 3.4–5.3)
RBC # BLD: 4.67 M/UL (ref 4.14–5.8)
SODIUM BLD-SCNC: 139 MEQ/L (ref 135–148)
STATUS: ABNORMAL
TOTAL PROTEIN: 6.8 G/DL (ref 6–8.3)
WBC: 5.8 K/UL (ref 3.5–10.9)

## 2022-11-02 PROCEDURE — 99211 OFF/OP EST MAY X REQ PHY/QHP: CPT

## 2022-11-02 PROCEDURE — 99213 OFFICE O/P EST LOW 20 MIN: CPT | Performed by: INTERNAL MEDICINE

## 2022-11-02 ASSESSMENT — PATIENT HEALTH QUESTIONNAIRE - PHQ9
SUM OF ALL RESPONSES TO PHQ QUESTIONS 1-9: 0
2. FEELING DOWN, DEPRESSED OR HOPELESS: 0
SUM OF ALL RESPONSES TO PHQ QUESTIONS 1-9: 0
1. LITTLE INTEREST OR PLEASURE IN DOING THINGS: 0
SUM OF ALL RESPONSES TO PHQ QUESTIONS 1-9: 0
SUM OF ALL RESPONSES TO PHQ QUESTIONS 1-9: 0
SUM OF ALL RESPONSES TO PHQ9 QUESTIONS 1 & 2: 0

## 2022-11-02 NOTE — PROGRESS NOTES
MA Rooming Questions  Patient: Tali Broussard  MRN: [id number]    Date: 11/2/2022        1. Do you have any new issues?   no         2. Do you need any refills on medications?    no    3. Have you had any imaging done since your last visit?   no    4. Have you been hospitalized or seen in the emergency room since your last visit here?   no    5. Did the patient have a depression screening completed today?  Yes    PHQ-9 Total Score: 0 (11/2/2022  1:01 PM)       PHQ-9 Given to (if applicable):               PHQ-9 Score (if applicable):                     [] Positive     [x]  Negative              Does question #9 need addressed (if applicable)                     [] Yes    []  No               Richa Mcqueen CMA

## 2022-11-04 ENCOUNTER — TELEPHONE (OUTPATIENT)
Dept: ONCOLOGY | Age: 62
End: 2022-11-04

## 2022-11-04 DIAGNOSIS — D45 PRIMARY POLYCYTHEMIA (HCC): Primary | ICD-10-CM

## 2022-11-04 NOTE — TELEPHONE ENCOUNTER
Patient would like a call back to review lab results. Would like to know if he is needing a phlebotomy.

## 2022-11-07 ENCOUNTER — CLINICAL DOCUMENTATION (OUTPATIENT)
Dept: ONCOLOGY | Age: 62
End: 2022-11-07

## 2022-11-07 RX ORDER — 0.9 % SODIUM CHLORIDE 0.9 %
250 INTRAVENOUS SOLUTION INTRAVENOUS ONCE
Status: CANCELLED | OUTPATIENT
Start: 2022-11-07 | End: 2022-11-07

## 2022-11-09 ENCOUNTER — HOSPITAL ENCOUNTER (OUTPATIENT)
Dept: INFUSION THERAPY | Age: 62
Setting detail: INFUSION SERIES
Discharge: HOME | End: 2022-11-09

## 2022-11-10 ENCOUNTER — HOSPITAL ENCOUNTER (OUTPATIENT)
Dept: INFUSION THERAPY | Age: 62
Setting detail: INFUSION SERIES
Discharge: HOME | End: 2022-11-10
Payer: COMMERCIAL

## 2022-11-10 VITALS
OXYGEN SATURATION: 98 % | RESPIRATION RATE: 16 BRPM | TEMPERATURE: 97.3 F | HEART RATE: 96 BPM | SYSTOLIC BLOOD PRESSURE: 150 MMHG | DIASTOLIC BLOOD PRESSURE: 80 MMHG

## 2022-11-10 DIAGNOSIS — D45 PRIMARY POLYCYTHEMIA (HCC): Primary | ICD-10-CM

## 2022-11-10 PROCEDURE — 99211 OFF/OP EST MAY X REQ PHY/QHP: CPT

## 2022-11-10 PROCEDURE — 99195 PHLEBOTOMY: CPT

## 2022-11-10 RX ORDER — 0.9 % SODIUM CHLORIDE 0.9 %
250 INTRAVENOUS SOLUTION INTRAVENOUS ONCE
Status: CANCELLED | OUTPATIENT
Start: 2022-11-10 | End: 2022-11-10

## 2022-11-10 NOTE — PROGRESS NOTES
Diagnosis: polycythemia vera    Pre-Phlebotomy: /78, HR 91    Post-Phlebotomy: /80, HR 96    Volume Removed: 293 grams    Complications: none    Comments: none        Shahida Merchant RN

## 2022-11-29 NOTE — TELEPHONE ENCOUNTER
Lab results from 11/02/2022 reviewed with physician and agreeable to therapeutic phlebotomy. Order placed for phlebotomy: withdraw 500 mL blood x1 per physician order. Therapy plan added. Called patient @ [phone] to notify and explain that OP infusion will call once phlebotomy scheduled. Patient voices understanding. No further needs identified at this time. no

## 2023-02-10 RX ORDER — METOPROLOL SUCCINATE 25 MG/1
TABLET, EXTENDED RELEASE ORAL
Qty: 90 TABLET | Refills: 0 | Status: SHIPPED | OUTPATIENT
Start: 2023-02-10

## 2023-02-13 ENCOUNTER — CLINICAL DOCUMENTATION (OUTPATIENT)
Dept: ONCOLOGY | Age: 63
End: 2023-02-13

## 2023-02-13 DIAGNOSIS — D45 PRIMARY POLYCYTHEMIA (HCC): Primary | ICD-10-CM

## 2023-02-13 RX ORDER — 0.9 % SODIUM CHLORIDE 0.9 %
250 INTRAVENOUS SOLUTION INTRAVENOUS ONCE
Status: CANCELLED | OUTPATIENT
Start: 2023-02-13 | End: 2023-02-13

## 2023-02-13 NOTE — PROGRESS NOTES
This nurse called the patient @ [phone] to notify him that the provider has reviewed his labs and recommended therapeutic phlebotomy. The patient is in agreement to this plan. This nurse advised that the order had been placed and he would receive a call with the appointment date and time.

## 2023-02-16 ENCOUNTER — HOSPITAL ENCOUNTER (OUTPATIENT)
Dept: INFUSION THERAPY | Age: 63
Setting detail: INFUSION SERIES
Discharge: HOME | End: 2023-02-16
Payer: COMMERCIAL

## 2023-02-16 VITALS
TEMPERATURE: 97.8 F | RESPIRATION RATE: 14 BRPM | OXYGEN SATURATION: 98 % | SYSTOLIC BLOOD PRESSURE: 133 MMHG | DIASTOLIC BLOOD PRESSURE: 80 MMHG | HEART RATE: 100 BPM

## 2023-02-16 DIAGNOSIS — D45 PRIMARY POLYCYTHEMIA (HCC): Primary | ICD-10-CM

## 2023-02-16 PROCEDURE — 99211 OFF/OP EST MAY X REQ PHY/QHP: CPT

## 2023-02-16 PROCEDURE — 99195 PHLEBOTOMY: CPT

## 2023-04-27 RX ORDER — METOPROLOL SUCCINATE 25 MG/1
TABLET, EXTENDED RELEASE ORAL
Qty: 90 TABLET | Refills: 0 | Status: SHIPPED | OUTPATIENT
Start: 2023-04-27 | End: 2023-04-28 | Stop reason: SDUPTHER

## 2023-04-28 ENCOUNTER — TELEPHONE (OUTPATIENT)
Dept: ONCOLOGY | Age: 63
End: 2023-04-28

## 2023-04-28 RX ORDER — METOPROLOL SUCCINATE 25 MG/1
TABLET, EXTENDED RELEASE ORAL
Qty: 90 TABLET | Refills: 1 | Status: SHIPPED | OUTPATIENT
Start: 2023-04-28

## 2023-04-28 NOTE — TELEPHONE ENCOUNTER
4/28/23 - left pt vm for the 5/3/23 US at [facility] arrival time of 10:00 am and NPO 8 hours prior. I moved the Dr Sandor Arguelles appt to after the [address] [facility] on 5/5/23 at 2:00 pm. I asked pt to call back and confirm appt.

## 2023-05-03 ENCOUNTER — HOSPITAL ENCOUNTER (OUTPATIENT)
Dept: ULTRASOUND IMAGING | Age: 63
Discharge: HOME | End: 2023-05-03
Payer: COMMERCIAL

## 2023-05-03 DIAGNOSIS — D45 PRIMARY POLYCYTHEMIA (HCC): ICD-10-CM

## 2023-05-03 PROCEDURE — 76705 ECHO EXAM OF ABDOMEN: CPT

## 2023-05-05 ENCOUNTER — HOSPITAL ENCOUNTER (OUTPATIENT)
Dept: INFUSION THERAPY | Age: 63
Discharge: HOME | End: 2023-05-05
Payer: COMMERCIAL

## 2023-05-05 ENCOUNTER — OFFICE VISIT (OUTPATIENT)
Dept: ONCOLOGY | Age: 63
End: 2023-05-05
Payer: COMMERCIAL

## 2023-05-05 VITALS
HEART RATE: 85 BPM | WEIGHT: 203.8 LBS | SYSTOLIC BLOOD PRESSURE: 135 MMHG | TEMPERATURE: 97.8 F | DIASTOLIC BLOOD PRESSURE: 81 MMHG | OXYGEN SATURATION: 98 % | BODY MASS INDEX: 30.18 KG/M2 | HEIGHT: 69 IN

## 2023-05-05 DIAGNOSIS — D45 PRIMARY POLYCYTHEMIA (HCC): Primary | ICD-10-CM

## 2023-05-05 PROCEDURE — 99211 OFF/OP EST MAY X REQ PHY/QHP: CPT

## 2023-05-05 PROCEDURE — 99213 OFFICE O/P EST LOW 20 MIN: CPT | Performed by: INTERNAL MEDICINE

## 2023-05-05 ASSESSMENT — PATIENT HEALTH QUESTIONNAIRE - PHQ9
SUM OF ALL RESPONSES TO PHQ QUESTIONS 1-9: 0
SUM OF ALL RESPONSES TO PHQ QUESTIONS 1-9: 0
1. LITTLE INTEREST OR PLEASURE IN DOING THINGS: 0
SUM OF ALL RESPONSES TO PHQ QUESTIONS 1-9: 0
SUM OF ALL RESPONSES TO PHQ QUESTIONS 1-9: 0
2. FEELING DOWN, DEPRESSED OR HOPELESS: 0
SUM OF ALL RESPONSES TO PHQ9 QUESTIONS 1 & 2: 0

## 2023-05-05 NOTE — PROGRESS NOTES
MA Rooming Questions  Patient: Maia Wright  MRN: [id number]    Date: 5/5/2023        1. Do you have any new issues?   no         2. Do you need any refills on medications?    no    3. Have you had any imaging done since your last visit? yes - u/s     4. Have you been hospitalized or seen in the emergency room since your last visit here?   no    5. Did the patient have a depression screening completed today?  Yes    PHQ-9 Total Score: 0 (5/5/2023  2:03 PM)       PHQ-9 Given to (if applicable):               PHQ-9 Score (if applicable):                     [] Positive     [x]  Negative              Does question #9 need addressed (if applicable)                     [] Yes    []  No               Joi Reynoso, CMA

## 2023-05-08 ENCOUNTER — CLINICAL DOCUMENTATION (OUTPATIENT)
Dept: ONCOLOGY | Age: 63
End: 2023-05-08

## 2023-05-17 ENCOUNTER — HOSPITAL ENCOUNTER (OUTPATIENT)
Dept: INFUSION THERAPY | Age: 63
Setting detail: INFUSION SERIES
Discharge: HOME | End: 2023-05-17
Payer: COMMERCIAL

## 2023-05-17 VITALS
SYSTOLIC BLOOD PRESSURE: 130 MMHG | OXYGEN SATURATION: 98 % | DIASTOLIC BLOOD PRESSURE: 76 MMHG | HEART RATE: 87 BPM | RESPIRATION RATE: 16 BRPM

## 2023-05-17 DIAGNOSIS — D45 PRIMARY POLYCYTHEMIA (HCC): Primary | ICD-10-CM

## 2023-05-17 PROCEDURE — 99195 PHLEBOTOMY: CPT

## 2023-05-17 NOTE — PROGRESS NOTES
Diagnosis: polycythemia vera    Pre-Phlebotomy: /67, HR 85    Post-Phlebotomy: /76, HR 87    Volume Removed: 911 grams    Complications: none    Comments: left arm      Judy Brody RN

## 2023-08-11 ENCOUNTER — TELEPHONE (OUTPATIENT)
Dept: ONCOLOGY | Age: 63
End: 2023-08-11

## 2023-08-11 DIAGNOSIS — D45 PRIMARY POLYCYTHEMIA (HCC): Primary | ICD-10-CM

## 2023-08-11 RX ORDER — 0.9 % SODIUM CHLORIDE 0.9 %
250 INTRAVENOUS SOLUTION INTRAVENOUS ONCE
OUTPATIENT
Start: 2023-08-11 | End: 2023-08-11

## 2023-08-11 NOTE — TELEPHONE ENCOUNTER
Patient had labs a compunet 8/3/23 (now in media. Patient would like to know if he needs a phlebotomy.

## 2023-08-11 NOTE — TELEPHONE ENCOUNTER
Called patient [phone] after reviewing lab results with physician. Hgb 16.8/Hct 46.7%. Order placed for therapeutic phlebotomy: withdraw 500 mL blood x1 per physician instruction. Therapy plan added. Patient will be contacted with appointment time once scheduled at [facility] OP infusion. Voices understanding. No further needs addressed at this time.

## 2023-08-14 ENCOUNTER — CLINICAL DOCUMENTATION (OUTPATIENT)
Dept: ONCOLOGY | Age: 63
End: 2023-08-14

## 2023-08-14 RX ORDER — HYDROXYUREA 500 MG/1
CAPSULE ORAL
Qty: 90 CAPSULE | Refills: 3 | Status: SHIPPED | OUTPATIENT
Start: 2023-08-14

## 2023-08-14 RX ORDER — METOPROLOL SUCCINATE 25 MG/1
TABLET, EXTENDED RELEASE ORAL
Qty: 90 TABLET | Refills: 1 | Status: SHIPPED | OUTPATIENT
Start: 2023-08-14

## 2023-08-14 NOTE — TELEPHONE ENCOUNTER
Patient contacted our office in need of refill for HYDREA, METOPROLOL. Patient has a scheduled appointment on 11/9/23. Patients preferred pharmacy is [id number] [place].  Pending for patients chart provider EDWIGE COLEY

## 2023-08-17 ENCOUNTER — HOSPITAL ENCOUNTER (OUTPATIENT)
Dept: INFUSION THERAPY | Age: 63
Setting detail: INFUSION SERIES
Discharge: HOME | End: 2023-08-17
Payer: COMMERCIAL

## 2023-08-17 VITALS
RESPIRATION RATE: 16 BRPM | TEMPERATURE: 97.5 F | HEART RATE: 87 BPM | DIASTOLIC BLOOD PRESSURE: 78 MMHG | OXYGEN SATURATION: 96 % | SYSTOLIC BLOOD PRESSURE: 127 MMHG

## 2023-08-17 DIAGNOSIS — D45 PRIMARY POLYCYTHEMIA (HCC): Primary | ICD-10-CM

## 2023-08-17 PROCEDURE — 99195 PHLEBOTOMY: CPT

## 2023-08-17 RX ORDER — 0.9 % SODIUM CHLORIDE 0.9 %
250 INTRAVENOUS SOLUTION INTRAVENOUS ONCE
Status: CANCELLED | OUTPATIENT
Start: 2023-08-17 | End: 2023-08-17

## 2023-08-17 RX ORDER — 0.9 % SODIUM CHLORIDE 0.9 %
250 INTRAVENOUS SOLUTION INTRAVENOUS ONCE
Status: DISCONTINUED | OUTPATIENT
Start: 2023-08-17 | End: 2023-08-17

## 2023-08-17 NOTE — PROGRESS NOTES
Diagnosis: polycythemia vera    Pre-Phlebotomy: /69, HR 80    Post-Phlebotomy: /78, HR 87    Volume Removed: 196 grams    Complications: none    Comments: left arm        Michael Banerjee RN

## 2023-11-09 ENCOUNTER — HOSPITAL ENCOUNTER (OUTPATIENT)
Dept: INFUSION THERAPY | Age: 63
Discharge: HOME | End: 2023-11-09
Payer: COMMERCIAL

## 2023-11-09 ENCOUNTER — OFFICE VISIT (OUTPATIENT)
Dept: ONCOLOGY | Age: 63
End: 2023-11-09
Payer: COMMERCIAL

## 2023-11-09 VITALS
WEIGHT: 203 LBS | TEMPERATURE: 98 F | HEIGHT: 69 IN | HEART RATE: 73 BPM | SYSTOLIC BLOOD PRESSURE: 171 MMHG | DIASTOLIC BLOOD PRESSURE: 78 MMHG | OXYGEN SATURATION: 98 % | BODY MASS INDEX: 30.07 KG/M2

## 2023-11-09 DIAGNOSIS — D45 PRIMARY POLYCYTHEMIA (HCC): Primary | ICD-10-CM

## 2023-11-09 PROCEDURE — 99211 OFF/OP EST MAY X REQ PHY/QHP: CPT

## 2023-11-09 PROCEDURE — 99213 OFFICE O/P EST LOW 20 MIN: CPT | Performed by: INTERNAL MEDICINE

## 2023-11-09 ASSESSMENT — PATIENT HEALTH QUESTIONNAIRE - PHQ9
SUM OF ALL RESPONSES TO PHQ QUESTIONS 1-9: 0
2. FEELING DOWN, DEPRESSED OR HOPELESS: 0
SUM OF ALL RESPONSES TO PHQ QUESTIONS 1-9: 0
SUM OF ALL RESPONSES TO PHQ9 QUESTIONS 1 & 2: 0
1. LITTLE INTEREST OR PLEASURE IN DOING THINGS: 0

## 2023-11-09 NOTE — PROGRESS NOTES
MA Rooming Questions  Patient: Geroldine Leventhal  MRN: [id number]    Date: 11/9/2023        1. Do you have any new issues?   no         2. Do you need any refills on medications?    no    3. Have you had any imaging done since your last visit?   no    4. Have you been hospitalized or seen in the emergency room since your last visit here?   no    5. Did the patient have a depression screening completed today?  Yes    PHQ-9 Total Score: 0 (11/9/2023  1:23 PM)       PHQ-9 Given to (if applicable):               PHQ-9 Score (if applicable):                     [] Positive     []  Negative              Does question #9 need addressed (if applicable)                     [] Yes    []  No               Zina Meredith CMA
positive polycythemia vera. On November 9, 2023, he presented to me for followup. I have been following Mr. Matthew France for JAK2  positive polycythemia vera and he has been on therapeutic phlebotomies as needed. He stated that his general sense of wellbeing is getting better with the phlebotomy. He is tolerating the phlebotomy well and he does not encounter any major side effects from it. I recognized that his hematocrit was 49.9 on 10/30/23 and I recommend him to have one phlebotomy soon. I will try to keep his hematocrit less than 45%. He is also on hydroxyurea 500 mg daily for his thrombocytosis and he is tolerating hydroxyurea well. His platelet count was 528,846/XABI on 10/30/23 and recommend him to continue with current dose of hydrea for now. He is also on aspirin 162 mg daily and I recommend him to continue with that on regular basis to prevent JAK2 positive myeloproliferative neoplasm induced thromboembolic episodes. He doesn't have hepato-splenomegaly on physical examination. US abdomen done on 11/2/2023 showed mild splenomegaly (13.6 cm). I will monitor his blood count every 3 month interval and will do phlebotomy as needed to keep his hematocrit less than 45%. Hypertension - his SBP is mildly elevated. To continue with metoprolol XL 25 mg daily. Diabetes - he is on lantus insulin. Recommend ADA diet. Recommend him to have age appropriate cancer screening, exercise, low fat and low sodium diet. I answered all his questions and concerns for today. Recent imaging and labs were reviewed and discussed with the patient.

## 2023-11-10 ENCOUNTER — CLINICAL DOCUMENTATION (OUTPATIENT)
Dept: ONCOLOGY | Age: 63
End: 2023-11-10

## 2023-11-15 ENCOUNTER — HOSPITAL ENCOUNTER (OUTPATIENT)
Dept: INFUSION THERAPY | Age: 63
Setting detail: INFUSION SERIES
Discharge: HOME | End: 2023-11-15
Payer: COMMERCIAL

## 2023-11-15 VITALS
RESPIRATION RATE: 16 BRPM | TEMPERATURE: 97.3 F | SYSTOLIC BLOOD PRESSURE: 132 MMHG | OXYGEN SATURATION: 97 % | DIASTOLIC BLOOD PRESSURE: 78 MMHG | HEART RATE: 104 BPM

## 2023-11-15 PROCEDURE — 99195 PHLEBOTOMY: CPT

## 2023-11-15 NOTE — PROGRESS NOTES
Diagnosis: POLYCYTHEMIA VERA    Pre-Phlebotomy: //84,     Post-Phlebotomy: /83,     Volume Removed: 062 grams    Complications: NONE    Comments: LEFT ARM      Dunia Zaragoza RN

## 2023-11-15 NOTE — PROGRESS NOTES
Pt taken to room 05 for phlebotomy. Pt oriented to room, call light, bed/chair controls, TV, pt voiced understanding. Plan of care explained to pt, pt voiced understanding.

## 2024-02-05 ENCOUNTER — CLINICAL DOCUMENTATION (OUTPATIENT)
Dept: ONCOLOGY | Age: 64
End: 2024-02-05

## 2024-04-09 RX ORDER — METOPROLOL SUCCINATE 25 MG/1
TABLET, EXTENDED RELEASE ORAL
Qty: 30 TABLET | Refills: 0 | Status: SHIPPED | OUTPATIENT
Start: 2024-04-09

## 2024-04-09 NOTE — TELEPHONE ENCOUNTER
Patient left message requesting a refill for Metoprolol  to be sent to [facility]. Pending RX to Provider to be sent to pharmacy.

## 2024-04-30 LAB
DIFFERENTIAL COUNT: NORMAL
HCT VFR BLD CALC: NORMAL % (ref 37.5–51)
HEMOGLOBIN: NORMAL G/DL (ref 13–17.7)
MCH RBC QN AUTO: NORMAL PG (ref 26–34)
MCHC RBC AUTO-ENTMCNC: NORMAL G/DL (ref 30.7–35.5)
MCV RBC AUTO: NORMAL FL (ref 80–100)
PDW BLD-RTO: NORMAL %
PLATELET # BLD: NORMAL K/UL (ref 140–400)
PLATELET COMMENT: NORMAL
PMV BLD AUTO: NORMAL FL (ref 7.2–11.7)
RBC # BLD: NORMAL M/UL (ref 4.14–5.8)
RBC COMMENT: NORMAL
WBC # BLD: NORMAL K/UL (ref 3.5–10.9)
WBC COMMENT: NORMAL

## 2024-06-06 LAB
BASOPHILS ABSOLUTE: 0.1 K/UL (ref 0–0.3)
BASOPHILS RELATIVE PERCENT: 1.1 % (ref 0–2)
DIFFERENTIAL COUNT: ABNORMAL
EOSINOPHILS ABSOLUTE: 0.1 K/UL (ref 0–0.5)
EOSINOPHILS RELATIVE PERCENT: 1.5 % (ref 0–5)
HCT VFR BLD CALC: 41.5 % (ref 37.5–51)
HEMOGLOBIN: 14.8 G/DL (ref 13–17.7)
IMMATURE GRANS (ABS): 0 K/UL (ref 0–0.1)
IMMATURE GRANULOCYTES %: 0.2 %
LYMPHOCYTES ABSOLUTE: 1.4 K/UL (ref 0.9–4.1)
LYMPHOCYTES RELATIVE PERCENT: 26.5 % (ref 14–51)
MCH RBC QN AUTO: 35.7 PG (ref 26–34)
MCHC RBC AUTO-ENTMCNC: 35.7 G/DL (ref 30.7–35.5)
MCV RBC AUTO: 100.2 FL (ref 80–100)
MONOCYTES ABSOLUTE: 0.4 K/UL (ref 0.2–1)
MONOCYTES RELATIVE PERCENT: 6.4 % (ref 4–12)
NEUTROPHILS ABSOLUTE: 3.5 K/UL (ref 1.8–7.5)
NEUTROPHILS RELATIVE PERCENT: 64.3 % (ref 42–80)
PDW BLD-RTO: 14 %
PLATELET # BLD: 262 K/UL (ref 140–400)
PMV BLD AUTO: 9.5 FL (ref 7.2–11.7)
RBC # BLD: 4.14 M/UL (ref 4.14–5.8)
RETICULOCYTE ABSOLUTE COUNT: 0 /100 WBC
WBC # BLD: 5.4 K/UL (ref 3.5–10.9)

## 2024-06-10 ENCOUNTER — OFFICE VISIT (OUTPATIENT)
Dept: ONCOLOGY | Age: 64
End: 2024-06-10
Payer: COMMERCIAL

## 2024-06-10 ENCOUNTER — HOSPITAL ENCOUNTER (OUTPATIENT)
Dept: INFUSION THERAPY | Age: 64
Discharge: HOME | End: 2024-06-10
Payer: COMMERCIAL

## 2024-06-10 VITALS
WEIGHT: 205 LBS | HEART RATE: 70 BPM | OXYGEN SATURATION: 99 % | SYSTOLIC BLOOD PRESSURE: 190 MMHG | HEIGHT: 69 IN | TEMPERATURE: 96.8 F | BODY MASS INDEX: 30.36 KG/M2 | DIASTOLIC BLOOD PRESSURE: 86 MMHG

## 2024-06-10 DIAGNOSIS — D45 PRIMARY POLYCYTHEMIA (HCC): Primary | ICD-10-CM

## 2024-06-10 PROCEDURE — 99214 OFFICE O/P EST MOD 30 MIN: CPT | Performed by: INTERNAL MEDICINE

## 2024-06-10 PROCEDURE — 99211 OFF/OP EST MAY X REQ PHY/QHP: CPT

## 2024-06-10 ASSESSMENT — PATIENT HEALTH QUESTIONNAIRE - PHQ9
SUM OF ALL RESPONSES TO PHQ QUESTIONS 1-9: 0
SUM OF ALL RESPONSES TO PHQ9 QUESTIONS 1 & 2: 0
1. LITTLE INTEREST OR PLEASURE IN DOING THINGS: NOT AT ALL
2. FEELING DOWN, DEPRESSED OR HOPELESS: NOT AT ALL

## 2024-06-10 NOTE — PROGRESS NOTES
Patient Name: Americo Rg  Patient : 1960  Patient MRN: [id number]     Primary Oncologist: Uzair Cummings MD  Referring Provider: Ladarius Little MD     Date of Service: 6/10/2024      Chief Complaint:   Chief Complaint   Patient presents with    Follow-up     Patient Active Problem List:     Polycythemia, primary     Diabetes mellitus without complication (HCC)    HPI:   Mr. Rg ( 3/5/6dd1) was diagnosed with JAK2 gene mutation positive Polycythemia vera in 2013. He had an uncontrolled bleeding after tooth extraction.  Found elevated counts. W/U including H/H elevated in the 21/60 range and BRIGHT-2 gene mutation positive 43.4 percent cells confirmed Dx of Polycythemia Vera. No evidence of bcr/abl 1 translocation by FISH.    He was started on phlebotomies and felt much better after a couple of phlebotomies and his hemoglobin dropped down from 22 to 16 and hematocrit from 64 to 47.    Platelet counts are hovering around 600- 700,000 per cubic millimeter.  Thus he was also started on Hydrea in 2014.  Trying to maintain Platelet counts close to normal range.Need for Phlebotomies has gone down to every 2-3 months    On Kristin 10, 2024, he presented to me for followup. I have been following Mr. Rg for JAK2  positive polycythemia vera and he has been on therapeutic phlebotomies as needed.  He stated that his general sense of wellbeing is getting better with the phlebotomy.  He is tolerating the phlebotomy well and he does not encounter any major side effects from it.      I recognized that his hematocrit was 41.5 on 24 and he doesn't need phlebotomy this time. I will try to keep his hematocrit less than 45%.    He is also on hydroxyurea 500 mg daily for his thrombocytosis. He is here for close monitoring of toxicity and side effects from hydroxyurea. He is tolerating hydroxyurea well. He doesn't encounter any major side effects from it. His platelet count was 262,000/cumm on 24 and

## 2024-06-10 NOTE — PROGRESS NOTES
MA Rooming Questions  Patient: Americo Rg  MRN: [id number]    Date: 6/10/2024        1. Do you have any new issues?   no         2. Do you need any refills on medications?    no    3. Have you had any imaging done since your last visit?   no    4. Have you been hospitalized or seen in the emergency room since your last visit here?   no    5. Did the patient have a depression screening completed today? Yes    PHQ-9 Total Score: 0 (6/10/2024 11:34 AM)       PHQ-9 Given to (if applicable):               PHQ-9 Score (if applicable):                     [] Positive     [x]  Negative              Does question #9 need addressed (if applicable)                     [] Yes    []  No               Anny Nguyen CMA

## 2024-07-08 RX ORDER — METOPROLOL SUCCINATE 25 MG/1
TABLET, EXTENDED RELEASE ORAL
Qty: 30 TABLET | Refills: 0 | Status: SHIPPED | OUTPATIENT
Start: 2024-07-08

## 2024-07-08 NOTE — TELEPHONE ENCOUNTER
Patient left message requesting a refill for metoprolol to be sent to [facility] in pharmacy. Pending RX to Provider to be sent to pharmacy.

## 2024-08-12 RX ORDER — METOPROLOL SUCCINATE 25 MG/1
TABLET, EXTENDED RELEASE ORAL
Qty: 90 TABLET | Refills: 0 | Status: SHIPPED | OUTPATIENT
Start: 2024-08-12

## 2024-08-12 NOTE — TELEPHONE ENCOUNTER
Patient left message requesting a refill for metoprolol to be sent to [facility]. Pending RX to Provider to be sent to pharmacy.

## 2024-09-11 LAB
BASOPHILS ABSOLUTE: 0.1 K/UL (ref 0–0.3)
BASOPHILS RELATIVE PERCENT: 1.2 % (ref 0–2)
DIFFERENTIAL COUNT: ABNORMAL
EOSINOPHILS ABSOLUTE: 0.1 K/UL (ref 0–0.5)
EOSINOPHILS RELATIVE PERCENT: 2.1 % (ref 0–5)
HCT VFR BLD CALC: 48.2 % (ref 37.5–51)
HEMOGLOBIN: 16.8 G/DL (ref 13–17.7)
IMMATURE GRANS (ABS): 0 K/UL (ref 0–0.1)
IMMATURE GRANULOCYTES %: 0.4 %
LYMPHOCYTES ABSOLUTE: 1.6 K/UL (ref 0.9–4.1)
LYMPHOCYTES RELATIVE PERCENT: 23.3 % (ref 14–51)
MCH RBC QN AUTO: 34.8 PG (ref 26–34)
MCHC RBC AUTO-ENTMCNC: 34.9 G/DL (ref 30.7–35.5)
MCV RBC AUTO: 99.8 FL (ref 80–100)
MONOCYTES ABSOLUTE: 0.4 K/UL (ref 0.2–1)
MONOCYTES RELATIVE PERCENT: 6.6 % (ref 4–12)
NEUTROPHILS ABSOLUTE: 4.4 K/UL (ref 1.8–7.5)
NEUTROPHILS RELATIVE PERCENT: 66.4 % (ref 42–80)
PDW BLD-RTO: 13.5 %
PLATELET # BLD: 351 K/UL (ref 140–400)
PMV BLD AUTO: 9.5 FL (ref 7.2–11.7)
RBC # BLD: 4.83 M/UL (ref 4.14–5.8)
RETICULOCYTE ABSOLUTE COUNT: 0 /100 WBC
WBC # BLD: 6.7 K/UL (ref 3.5–10.9)

## 2024-09-13 ENCOUNTER — TELEPHONE (OUTPATIENT)
Dept: ONCOLOGY | Age: 64
End: 2024-09-13

## 2024-09-13 DIAGNOSIS — D45 PRIMARY POLYCYTHEMIA (HCC): Primary | ICD-10-CM

## 2024-09-13 RX ORDER — 0.9 % SODIUM CHLORIDE 0.9 %
250 INTRAVENOUS SOLUTION INTRAVENOUS ONCE
OUTPATIENT
Start: 2024-09-13 | End: 2024-09-13

## 2024-09-23 RX ORDER — HYDROXYUREA 500 MG/1
CAPSULE ORAL
Qty: 90 CAPSULE | Refills: 2 | Status: SHIPPED | OUTPATIENT
Start: 2024-09-23

## 2024-09-24 ENCOUNTER — HOSPITAL ENCOUNTER (OUTPATIENT)
Dept: INFUSION THERAPY | Age: 64
Setting detail: INFUSION SERIES
Discharge: HOME | End: 2024-09-24
Payer: COMMERCIAL

## 2024-09-24 VITALS
HEART RATE: 92 BPM | DIASTOLIC BLOOD PRESSURE: 79 MMHG | OXYGEN SATURATION: 97 % | TEMPERATURE: 98.4 F | RESPIRATION RATE: 16 BRPM | SYSTOLIC BLOOD PRESSURE: 147 MMHG

## 2024-09-24 DIAGNOSIS — D45 PRIMARY POLYCYTHEMIA (HCC): Primary | ICD-10-CM

## 2024-09-24 PROCEDURE — 99195 PHLEBOTOMY: CPT

## 2024-09-24 RX ORDER — 0.9 % SODIUM CHLORIDE 0.9 %
250 INTRAVENOUS SOLUTION INTRAVENOUS ONCE
Status: DISCONTINUED | OUTPATIENT
Start: 2024-09-24 | End: 2024-09-24

## 2024-09-24 RX ORDER — 0.9 % SODIUM CHLORIDE 0.9 %
250 INTRAVENOUS SOLUTION INTRAVENOUS ONCE
Status: CANCELLED | OUTPATIENT
Start: 2024-09-24 | End: 2024-09-24

## 2024-09-24 NOTE — PROGRESS NOTES
Diagnosis: HEMOCHROMATOSIS    Pre-Phlebotomy: /79, HR 95    Volume Removed: 560 grams Left arm    Post-Phlebotomy: /79, HR 92      Complications: NONE,    {Zina Singer RN

## 2024-11-11 RX ORDER — METOPROLOL SUCCINATE 25 MG/1
TABLET, EXTENDED RELEASE ORAL
Qty: 90 TABLET | Refills: 0 | Status: SHIPPED | OUTPATIENT
Start: 2024-11-11

## 2024-12-10 LAB
BASOPHILS ABSOLUTE: 0.1 K/UL (ref 0–0.3)
BASOPHILS RELATIVE PERCENT: 1.5 % (ref 0–2)
DIFFERENTIAL COUNT: ABNORMAL
EOSINOPHILS ABSOLUTE: 0.1 K/UL (ref 0–0.5)
EOSINOPHILS RELATIVE PERCENT: 2.1 % (ref 0–5)
HCT VFR BLD CALC: 54.6 % (ref 37.5–51)
HEMOGLOBIN: 18.3 G/DL (ref 13–17.7)
IMMATURE GRANS (ABS): 0 K/UL (ref 0–0.1)
IMMATURE GRANULOCYTES %: 0.2 %
LYMPHOCYTES ABSOLUTE: 1.3 K/UL (ref 0.9–4.1)
LYMPHOCYTES RELATIVE PERCENT: 24.8 % (ref 14–51)
MCH RBC QN AUTO: 32.9 PG (ref 26–34)
MCHC RBC AUTO-ENTMCNC: 33.5 G/DL (ref 30.7–35.5)
MCV RBC AUTO: 98.2 FL (ref 80–100)
MONOCYTES ABSOLUTE: 0.4 K/UL (ref 0.2–1)
MONOCYTES RELATIVE PERCENT: 7.4 % (ref 4–12)
NEUTROPHILS ABSOLUTE: 3.4 K/UL (ref 1.8–7.5)
NEUTROPHILS RELATIVE PERCENT: 64 % (ref 42–80)
PDW BLD-RTO: 14 %
PLATELET # BLD: 369 K/UL (ref 140–400)
PMV BLD AUTO: 9.5 FL (ref 7.2–11.7)
RBC # BLD: 5.56 M/UL (ref 4.14–5.8)
RETICULOCYTE ABSOLUTE COUNT: 0 /100 WBC
WBC # BLD: 5.3 K/UL (ref 3.5–10.9)

## 2024-12-11 ENCOUNTER — CLINICAL DOCUMENTATION (OUTPATIENT)
Dept: ONCOLOGY | Age: 64
End: 2024-12-11

## 2024-12-11 DIAGNOSIS — D45 PRIMARY POLYCYTHEMIA (HCC): Primary | ICD-10-CM

## 2024-12-11 RX ORDER — 0.9 % SODIUM CHLORIDE 0.9 %
250 INTRAVENOUS SOLUTION INTRAVENOUS ONCE
OUTPATIENT
Start: 2024-12-11 | End: 2024-12-11

## 2024-12-11 NOTE — PROGRESS NOTES
Called patient @ [phone] after reviewing lab results. Two time phlebotomy offered. Patient agreeable. Order placed for therapeutic phlebotomy: withdraw 500 mL blood x2 per physician instruction. Therapy plan added. Patient will be contacted with appointment time once scheduled at [facility] OP infusion. Voices understanding. No further needs addressed at this time.

## 2024-12-11 NOTE — PROGRESS NOTES
This nurse called the patient @ [phone] to review lab results. However there was no answer. This nurse left a VM for the patient requesting a call back, this RN's direct number left.   The patient will need to be scheduled for therapeutic phlebotomy x 2, 1 week apart. Therapy plan entered.

## 2024-12-12 ENCOUNTER — CLINICAL DOCUMENTATION (OUTPATIENT)
Dept: ONCOLOGY | Age: 64
End: 2024-12-12

## 2024-12-17 ENCOUNTER — HOSPITAL ENCOUNTER (OUTPATIENT)
Dept: INFUSION THERAPY | Age: 64
Setting detail: INFUSION SERIES
Discharge: HOME | End: 2024-12-17
Payer: COMMERCIAL

## 2024-12-17 ENCOUNTER — HOSPITAL ENCOUNTER (OUTPATIENT)
Dept: INFUSION THERAPY | Age: 64
Discharge: HOME | End: 2024-12-17
Payer: COMMERCIAL

## 2024-12-17 ENCOUNTER — OFFICE VISIT (OUTPATIENT)
Dept: ONCOLOGY | Age: 64
End: 2024-12-17
Payer: COMMERCIAL

## 2024-12-17 VITALS
WEIGHT: 204.8 LBS | SYSTOLIC BLOOD PRESSURE: 164 MMHG | BODY MASS INDEX: 30.33 KG/M2 | OXYGEN SATURATION: 99 % | HEIGHT: 69 IN | RESPIRATION RATE: 18 BRPM | TEMPERATURE: 97.2 F | DIASTOLIC BLOOD PRESSURE: 85 MMHG | HEART RATE: 95 BPM

## 2024-12-17 VITALS
DIASTOLIC BLOOD PRESSURE: 77 MMHG | TEMPERATURE: 97.7 F | OXYGEN SATURATION: 99 % | RESPIRATION RATE: 16 BRPM | HEART RATE: 102 BPM | SYSTOLIC BLOOD PRESSURE: 137 MMHG

## 2024-12-17 DIAGNOSIS — D45 PRIMARY POLYCYTHEMIA (HCC): Primary | ICD-10-CM

## 2024-12-17 PROCEDURE — 99195 PHLEBOTOMY: CPT

## 2024-12-17 PROCEDURE — 99214 OFFICE O/P EST MOD 30 MIN: CPT | Performed by: INTERNAL MEDICINE

## 2024-12-17 PROCEDURE — 99211 OFF/OP EST MAY X REQ PHY/QHP: CPT

## 2024-12-17 RX ORDER — 0.9 % SODIUM CHLORIDE 0.9 %
250 INTRAVENOUS SOLUTION INTRAVENOUS ONCE
Status: CANCELLED | OUTPATIENT
Start: 2024-12-24 | End: 2024-12-24

## 2024-12-17 ASSESSMENT — PATIENT HEALTH QUESTIONNAIRE - PHQ9
2. FEELING DOWN, DEPRESSED OR HOPELESS: NOT AT ALL
SUM OF ALL RESPONSES TO PHQ QUESTIONS 1-9: 0
1. LITTLE INTEREST OR PLEASURE IN DOING THINGS: NOT AT ALL
SUM OF ALL RESPONSES TO PHQ QUESTIONS 1-9: 0
SUM OF ALL RESPONSES TO PHQ9 QUESTIONS 1 & 2: 0
SUM OF ALL RESPONSES TO PHQ QUESTIONS 1-9: 0
SUM OF ALL RESPONSES TO PHQ QUESTIONS 1-9: 0

## 2024-12-17 NOTE — PROGRESS NOTES
Diagnosis: POLYCYTHEMIA VERA    Pre-Phlebotomy: /74, HR 94    Volume Removed: 550 grams Left arm    Post-Phlebotomy: /77,       Complications: NONE,    {Zina Singer RN

## 2024-12-17 NOTE — PROGRESS NOTES
Patient Name: Americo Rg  Patient : 1960  Patient MRN: [id number]     Primary Oncologist: Uzair Cummings MD  Referring Provider: Ladarius Little MD     Date of Service: 2024      Chief Complaint:   Chief Complaint   Patient presents with    Follow-up     Patient Active Problem List:     Polycythemia, primary     Diabetes mellitus without complication (HCC)    HPI:   Mr. Rg ( 3/5/6dd1) was diagnosed with JAK2 gene mutation positive Polycythemia vera in 2013. He had an uncontrolled bleeding after tooth extraction.  Found elevated counts. W/U including H/H elevated in the 21/60 range and BRIGHT-2 gene mutation positive 43.4 percent cells confirmed Dx of Polycythemia Vera. No evidence of bcr/abl 1 translocation by FISH.    He was started on phlebotomies and felt much better after a couple of phlebotomies and his hemoglobin dropped down from 22 to 16 and hematocrit from 64 to 47.    Platelet counts are hovering around 600- 700,000 per cubic millimeter.  Thus he was also started on Hydrea in 2014.  Trying to maintain Platelet counts close to normal range.Need for Phlebotomies has gone down to every 2-3 months    On 2024, he presented to me for followup. I have been following Mr. Rg for JAK2  positive polycythemia vera and he has been on therapeutic phlebotomies as needed.  He stated that his general sense of wellbeing is getting better with the phlebotomy.  He is tolerating the phlebotomy well and he does not encounter any major side effects from it.      I recognized that his hematocrit was 54.6 on 12/10/24. I recommend him to have 2 phlebotomies this time. I will try to keep his hematocrit less than 45%.    He is also on hydroxyurea 500 mg daily for his thrombocytosis.     He is here for close monitoring of toxicity and side effects from hydroxyurea. He is tolerating hydroxyurea well. He doesn't encounter any major side effects from it. His platelet count was

## 2024-12-17 NOTE — PROGRESS NOTES
MA Rooming Questions  Patient: Americo Rg  MRN: [id number]    Date: 12/17/2024        1. Do you have any new issues?   no         2. Do you need any refills on medications?    no    3. Have you had any imaging done since your last visit?   no    4. Have you been hospitalized or seen in the emergency room since your last visit here?   no    5. Did the patient have a depression screening completed today? Yes    PHQ-9 Total Score: 0 (12/17/2024 11:54 AM)       PHQ-9 Given to (if applicable):               PHQ-9 Score (if applicable):                     [] Positive     []  Negative              Does question #9 need addressed (if applicable)                     [] Yes    []  No               Mckenna Romero MA

## 2024-12-27 ENCOUNTER — HOSPITAL ENCOUNTER (OUTPATIENT)
Dept: INFUSION THERAPY | Age: 64
Setting detail: INFUSION SERIES
Discharge: HOME | End: 2024-12-27
Payer: COMMERCIAL

## 2024-12-27 VITALS
OXYGEN SATURATION: 100 % | HEART RATE: 96 BPM | SYSTOLIC BLOOD PRESSURE: 156 MMHG | DIASTOLIC BLOOD PRESSURE: 91 MMHG | RESPIRATION RATE: 16 BRPM | TEMPERATURE: 97.2 F

## 2024-12-27 DIAGNOSIS — D45 PRIMARY POLYCYTHEMIA (HCC): Primary | ICD-10-CM

## 2024-12-27 PROCEDURE — 99195 PHLEBOTOMY: CPT

## 2024-12-27 RX ORDER — 0.9 % SODIUM CHLORIDE 0.9 %
250 INTRAVENOUS SOLUTION INTRAVENOUS ONCE
Status: CANCELLED | OUTPATIENT
Start: 2024-12-31 | End: 2024-12-31

## 2024-12-27 NOTE — PROGRESS NOTES
Diagnosis: polycythemia     Pre-Phlebotomy: /89, HR 66    Volume Removed: 525 grams Left arm    Post-Phlebotomy: /91, HR 96      Complications: none,    {Zian Singer, RN

## 2025-01-20 RX ORDER — METOPROLOL SUCCINATE 25 MG/1
TABLET, EXTENDED RELEASE ORAL
Qty: 90 TABLET | Refills: 0 | OUTPATIENT
Start: 2025-01-20

## 2025-02-03 RX ORDER — METOPROLOL SUCCINATE 25 MG/1
TABLET, EXTENDED RELEASE ORAL
Qty: 90 TABLET | Refills: 0 | Status: SHIPPED | OUTPATIENT
Start: 2025-02-03

## 2025-03-11 LAB
BASOPHILS ABSOLUTE: 0.1 K/UL (ref 0–0.3)
BASOPHILS RELATIVE PERCENT: 1.6 % (ref 0–2)
DIFFERENTIAL COUNT: ABNORMAL
EOSINOPHILS ABSOLUTE: 0.1 K/UL (ref 0–0.5)
EOSINOPHILS RELATIVE PERCENT: 1.6 % (ref 0–5)
HCT VFR BLD CALC: 51 % (ref 37.5–51)
HEMOGLOBIN: 17.4 G/DL (ref 13–17.7)
IMMATURE GRANS (ABS): 0.1 K/UL (ref 0–0.1)
IMMATURE GRANULOCYTES %: 0.8 %
LYMPHOCYTES ABSOLUTE: 1.3 K/UL (ref 0.9–4.1)
LYMPHOCYTES RELATIVE PERCENT: 21.6 % (ref 14–51)
MCH RBC QN AUTO: 33.5 PG (ref 26–34)
MCHC RBC AUTO-ENTMCNC: 34.1 G/DL (ref 30.7–35.5)
MCV RBC AUTO: 98.1 FL (ref 80–100)
MONOCYTES ABSOLUTE: 0.5 K/UL (ref 0.2–1)
MONOCYTES RELATIVE PERCENT: 8.7 % (ref 4–12)
NEUTROPHILS ABSOLUTE: 4 K/UL (ref 1.8–7.5)
NEUTROPHILS RELATIVE PERCENT: 65.7 % (ref 42–80)
PDW BLD-RTO: 14.4 %
PLATELET # BLD: 404 K/UL (ref 140–400)
PMV BLD AUTO: 9.8 FL (ref 7.2–11.7)
RBC # BLD: 5.2 M/UL (ref 4.14–5.8)
RETICULOCYTE ABSOLUTE COUNT: 0 /100 WBC
WBC # BLD: 6.1 K/UL (ref 3.5–10.9)

## 2025-03-13 ENCOUNTER — TELEPHONE (OUTPATIENT)
Dept: ONCOLOGY | Age: 65
End: 2025-03-13

## 2025-03-13 ENCOUNTER — CLINICAL DOCUMENTATION (OUTPATIENT)
Dept: ONCOLOGY | Age: 65
End: 2025-03-13

## 2025-03-13 DIAGNOSIS — D45 PRIMARY POLYCYTHEMIA (HCC): Primary | ICD-10-CM

## 2025-03-13 RX ORDER — 0.9 % SODIUM CHLORIDE 0.9 %
250 INTRAVENOUS SOLUTION INTRAVENOUS ONCE
OUTPATIENT
Start: 2025-03-13 | End: 2025-03-13

## 2025-03-13 NOTE — TELEPHONE ENCOUNTER
Lab results from [facility] reviewed. HCT 51%. Per physician note, recommend to keep hematocrit less than 45%. Order placed for therapeutic phlebotomy: withdraw 500 mL blood x1 per physician instruction. Therapy plan added. Patient will be contacted with appointment time once scheduled at [facility] OP infusion.

## 2025-03-13 NOTE — TELEPHONE ENCOUNTER
Patient called and lvm that he would like a call back to review labs from [facility] and to see if he needs a phlebotomy.

## 2025-03-19 ENCOUNTER — HOSPITAL ENCOUNTER (OUTPATIENT)
Dept: INFUSION THERAPY | Age: 65
Setting detail: INFUSION SERIES
Discharge: HOME | End: 2025-03-19
Payer: COMMERCIAL

## 2025-03-19 VITALS
DIASTOLIC BLOOD PRESSURE: 100 MMHG | HEART RATE: 89 BPM | OXYGEN SATURATION: 100 % | RESPIRATION RATE: 16 BRPM | SYSTOLIC BLOOD PRESSURE: 150 MMHG | TEMPERATURE: 97.2 F

## 2025-03-19 DIAGNOSIS — D45 PRIMARY POLYCYTHEMIA (HCC): Primary | ICD-10-CM

## 2025-03-19 PROCEDURE — 99195 PHLEBOTOMY: CPT

## 2025-03-19 RX ORDER — 0.9 % SODIUM CHLORIDE 0.9 %
250 INTRAVENOUS SOLUTION INTRAVENOUS ONCE
Status: CANCELLED | OUTPATIENT
Start: 2025-03-19 | End: 2025-03-19

## 2025-03-19 NOTE — PROGRESS NOTES
Diagnosis: polycythemia vera    Pre-Phlebotomy: /75, HR 85    Volume Removed: 525 grams Left arm    Post-Phlebotomy: /100, HR 89      Complications: none,    {Zina Singer RN

## 2025-04-28 RX ORDER — HYDROXYUREA 500 MG/1
500 CAPSULE ORAL DAILY
Qty: 90 CAPSULE | Refills: 2 | Status: SHIPPED | OUTPATIENT
Start: 2025-04-28

## 2025-05-21 SDOH — HEALTH STABILITY: PHYSICAL HEALTH: ON AVERAGE, HOW MANY MINUTES DO YOU ENGAGE IN EXERCISE AT THIS LEVEL?: 0 MIN

## 2025-05-21 SDOH — HEALTH STABILITY: PHYSICAL HEALTH: ON AVERAGE, HOW MANY DAYS PER WEEK DO YOU ENGAGE IN MODERATE TO STRENUOUS EXERCISE (LIKE A BRISK WALK)?: 0 DAYS

## 2025-05-22 ENCOUNTER — OFFICE VISIT (OUTPATIENT)
Age: 65
End: 2025-05-22
Payer: COMMERCIAL

## 2025-05-22 VITALS
HEART RATE: 84 BPM | SYSTOLIC BLOOD PRESSURE: 160 MMHG | BODY MASS INDEX: 30.69 KG/M2 | DIASTOLIC BLOOD PRESSURE: 82 MMHG | OXYGEN SATURATION: 98 % | RESPIRATION RATE: 20 BRPM | WEIGHT: 207.8 LBS

## 2025-05-22 DIAGNOSIS — G47.19 EXCESSIVE DAYTIME SLEEPINESS: ICD-10-CM

## 2025-05-22 DIAGNOSIS — E11.65 UNCONTROLLED TYPE 2 DIABETES MELLITUS WITH HYPERGLYCEMIA (HCC): Primary | ICD-10-CM

## 2025-05-22 DIAGNOSIS — I10 HYPERTENSION, UNSPECIFIED TYPE: ICD-10-CM

## 2025-05-22 PROCEDURE — 99204 OFFICE O/P NEW MOD 45 MIN: CPT

## 2025-05-22 PROCEDURE — 3079F DIAST BP 80-89 MM HG: CPT

## 2025-05-22 PROCEDURE — G2211 COMPLEX E/M VISIT ADD ON: HCPCS

## 2025-05-22 PROCEDURE — 3077F SYST BP >= 140 MM HG: CPT

## 2025-05-22 SDOH — ECONOMIC STABILITY: FOOD INSECURITY: WITHIN THE PAST 12 MONTHS, THE FOOD YOU BOUGHT JUST DIDN'T LAST AND YOU DIDN'T HAVE MONEY TO GET MORE.: NEVER TRUE

## 2025-05-22 SDOH — ECONOMIC STABILITY: FOOD INSECURITY: WITHIN THE PAST 12 MONTHS, YOU WORRIED THAT YOUR FOOD WOULD RUN OUT BEFORE YOU GOT MONEY TO BUY MORE.: NEVER TRUE

## 2025-05-22 ASSESSMENT — PATIENT HEALTH QUESTIONNAIRE - PHQ9
SUM OF ALL RESPONSES TO PHQ QUESTIONS 1-9: 0
SUM OF ALL RESPONSES TO PHQ QUESTIONS 1-9: 0
2. FEELING DOWN, DEPRESSED OR HOPELESS: NOT AT ALL
1. LITTLE INTEREST OR PLEASURE IN DOING THINGS: NOT AT ALL
SUM OF ALL RESPONSES TO PHQ QUESTIONS 1-9: 0
SUM OF ALL RESPONSES TO PHQ QUESTIONS 1-9: 0

## 2025-05-22 NOTE — PROGRESS NOTES
[facility]  [address]  [facility] [address]  Dept: [phone]  Dept Fax: [phone]  Loc: [phone]      Visit type: New patient    Encounter Start Time: 2:20 PM EDT  Encounter End Time: 2:54 PM EDT     100% of the time was spent with the patient today, discussing their symptoms, conducting an examination, reviewing the patient's diagnostic test results, and counseling    Reason for Visit: New Patient (Est. Care no other concerns)      Assessment and Plan         Assessment & Plan  Diabetes Mellitus  Controlled  Improving  MONITORING STUDIES:   - Regular A1c checks during next CBC on 06/10/2025  TREATMENT:   - Insulin  - Advised to maintain blood glucose levels between 140 and 150 mg/dL and avoid levels below 100 mg/dL  - Advised to limit candy bar intake to two on weekdays and three on weekends  - Increase water intake  - Space out meals to avoid blood sugar spikes  FUTURE PLANS:   - Potential benefits of a continuous glucose monitor discussed, but declined at this time    Polycythemia  Controlled  Improving  TREATMENT:   - Aspirin 160 mg daily  - Continues regular follow-ups with Dr. Cummings for management  FUTURE PLANS:   - No changes in current treatment plan    Hypertension  Uncontrolled  Worsening  TREATMENT:   - Metoprolol  - Monitoring blood pressure  FUTURE PLANS:   - Potential need to adjust medication discussed, prefers to consult with Dr. Cummings before making any changes  - Considering alternative medications if necessary    Migraines  Controlled  Improving  TREATMENT:   - Sumatriptan  - Increasing water intake recommended to help manage migraines  FUTURE PLANS:   - No new medications or therapies prescribed    Sleep Apnea  Controlled  Improving  TREATMENT:   - CPAP machine  FUTURE PLANS:   - Monitoring sleep quality and considering new devices if weight loss is achieved  - No immediate changes in treatment plan    Fungal

## 2025-06-11 ENCOUNTER — TELEPHONE (OUTPATIENT)
Dept: INFUSION THERAPY | Age: 65
End: 2025-06-11

## 2025-06-11 DIAGNOSIS — D45 PRIMARY POLYCYTHEMIA (HCC): Primary | ICD-10-CM

## 2025-06-11 LAB
BASOPHILS ABSOLUTE: 0.1 K/UL (ref 0–0.3)
BASOPHILS RELATIVE PERCENT: 1.5 % (ref 0–2)
DIFFERENTIAL COUNT: ABNORMAL
EOSINOPHILS ABSOLUTE: 0.1 K/UL (ref 0–0.5)
EOSINOPHILS RELATIVE PERCENT: 1.8 % (ref 0–5)
HCT VFR BLD CALC: 54.7 % (ref 37.5–51)
HEMOGLOBIN: 18.1 G/DL (ref 13–17.7)
IMMATURE GRANS (ABS): 0 K/UL (ref 0–0.1)
IMMATURE GRANULOCYTES %: 0.3 %
LYMPHOCYTES ABSOLUTE: 1.5 K/UL (ref 0.9–4.1)
LYMPHOCYTES RELATIVE PERCENT: 24.1 % (ref 14–51)
MCH RBC QN AUTO: 33.9 PG (ref 26–34)
MCHC RBC AUTO-ENTMCNC: 33.1 G/DL (ref 30.7–35.5)
MCV RBC AUTO: 102.4 FL (ref 80–100)
MONOCYTES ABSOLUTE: 0.5 K/UL (ref 0.2–1)
MONOCYTES RELATIVE PERCENT: 8.5 % (ref 4–12)
NEUTROPHILS ABSOLUTE: 3.9 K/UL (ref 1.8–7.5)
NEUTROPHILS RELATIVE PERCENT: 63.8 % (ref 42–80)
PDW BLD-RTO: 16.9 %
PLATELET # BLD: 482 K/UL (ref 140–400)
PMV BLD AUTO: 9.7 FL (ref 7.2–11.7)
RBC # BLD: 5.34 M/UL (ref 4.14–5.8)
RETICULOCYTE ABSOLUTE COUNT: 0 /100 WBC
WBC # BLD: 6.1 K/UL (ref 3.5–10.9)

## 2025-06-11 NOTE — PROGRESS NOTES
CBC results from 06/10/2025 reviewed. HCT (54.7). Physician recommending patient have 2 phlebotomies. Called patient @ [phone] to notify. Patient agreeable. Order placed for therapeutic phlebotomy: withdraw 500 mL blood weekly x2 per physician instruction. Therapy plan added. Explained that patient will be contacted with appointment time once scheduled at [facility] OP infusion.

## 2025-06-17 ENCOUNTER — OFFICE VISIT (OUTPATIENT)
Dept: ONCOLOGY | Age: 65
End: 2025-06-17
Payer: COMMERCIAL

## 2025-06-17 ENCOUNTER — HOSPITAL ENCOUNTER (OUTPATIENT)
Dept: INFUSION THERAPY | Age: 65
Setting detail: INFUSION SERIES
Discharge: HOME | End: 2025-06-17
Payer: COMMERCIAL

## 2025-06-17 ENCOUNTER — HOSPITAL ENCOUNTER (OUTPATIENT)
Dept: INFUSION THERAPY | Age: 65
Discharge: HOME | End: 2025-06-17
Payer: COMMERCIAL

## 2025-06-17 VITALS
HEIGHT: 69 IN | TEMPERATURE: 97.4 F | WEIGHT: 208.6 LBS | DIASTOLIC BLOOD PRESSURE: 72 MMHG | OXYGEN SATURATION: 99 % | SYSTOLIC BLOOD PRESSURE: 158 MMHG | HEART RATE: 87 BPM | BODY MASS INDEX: 30.9 KG/M2

## 2025-06-17 VITALS
HEART RATE: 106 BPM | TEMPERATURE: 97.5 F | DIASTOLIC BLOOD PRESSURE: 88 MMHG | RESPIRATION RATE: 18 BRPM | SYSTOLIC BLOOD PRESSURE: 146 MMHG | OXYGEN SATURATION: 98 %

## 2025-06-17 DIAGNOSIS — D45 PRIMARY POLYCYTHEMIA (HCC): Primary | ICD-10-CM

## 2025-06-17 PROCEDURE — 99195 PHLEBOTOMY: CPT

## 2025-06-17 PROCEDURE — 99212 OFFICE O/P EST SF 10 MIN: CPT

## 2025-06-17 PROCEDURE — 99214 OFFICE O/P EST MOD 30 MIN: CPT | Performed by: INTERNAL MEDICINE

## 2025-06-17 NOTE — PROGRESS NOTES
MA/LPN Rooming Questions  Patient: Americo Rg  MRN: [id number]    Date: 6/17/2025        1. Do you have any new issues?   no         2. Do you need any refills on medications?    no    3. Have you had any imaging done since your last visit?   no    4. Have you been hospitalized or seen in the emergency room since your last visit here?   no    5. Did the patient have a depression screening completed today? No    No data recorded     PHQ-9 Given to (if applicable):               PHQ-9 Score (if applicable):                     [] Positive     []  Negative              Does question #9 need addressed (if applicable)                     [] Yes    []  No               Anny Nguyen CMA

## 2025-07-01 ENCOUNTER — HOSPITAL ENCOUNTER (OUTPATIENT)
Dept: INFUSION THERAPY | Age: 65
Setting detail: INFUSION SERIES
Discharge: HOME | End: 2025-07-01
Payer: COMMERCIAL

## 2025-07-01 VITALS
SYSTOLIC BLOOD PRESSURE: 150 MMHG | OXYGEN SATURATION: 98 % | DIASTOLIC BLOOD PRESSURE: 68 MMHG | RESPIRATION RATE: 16 BRPM | HEART RATE: 90 BPM

## 2025-07-01 DIAGNOSIS — D45 PRIMARY POLYCYTHEMIA (HCC): Primary | ICD-10-CM

## 2025-07-01 PROCEDURE — 99195 PHLEBOTOMY: CPT

## 2025-07-01 NOTE — PROGRESS NOTES
Diagnosis: polycythemia vera     Pre-Phlebotomy: /67, HR 74    Volume Removed: 558 grams Left arm    Post-Phlebotomy: /68, HR 90      Complications: none,    {Zina Singer RN

## 2025-07-25 RX ORDER — METOPROLOL SUCCINATE 25 MG/1
TABLET, EXTENDED RELEASE ORAL
Qty: 90 TABLET | Refills: 0 | Status: SHIPPED | OUTPATIENT
Start: 2025-07-25

## 2025-07-25 NOTE — TELEPHONE ENCOUNTER
Patient left message requesting a refill for Metoprolol to be sent to [facility]. Pending RX to Provider to be sent to pharmacy.

## 2025-08-13 ENCOUNTER — OFFICE VISIT (OUTPATIENT)
Age: 65
End: 2025-08-13

## 2025-08-13 ENCOUNTER — HOSPITAL ENCOUNTER (EMERGENCY)
Age: 65
Discharge: HOME | End: 2025-08-13
Attending: EMERGENCY MEDICINE
Payer: COMMERCIAL

## 2025-08-13 ENCOUNTER — APPOINTMENT (OUTPATIENT)
Dept: GENERAL RADIOLOGY | Age: 65
End: 2025-08-13
Payer: COMMERCIAL

## 2025-08-13 VITALS
OXYGEN SATURATION: 99 % | BODY MASS INDEX: 30.13 KG/M2 | WEIGHT: 204 LBS | RESPIRATION RATE: 20 BRPM | SYSTOLIC BLOOD PRESSURE: 150 MMHG | DIASTOLIC BLOOD PRESSURE: 80 MMHG | HEART RATE: 106 BPM

## 2025-08-13 VITALS
RESPIRATION RATE: 17 BRPM | SYSTOLIC BLOOD PRESSURE: 136 MMHG | WEIGHT: 204 LBS | HEART RATE: 96 BPM | DIASTOLIC BLOOD PRESSURE: 78 MMHG | HEIGHT: 69 IN | OXYGEN SATURATION: 93 % | TEMPERATURE: 98.5 F | BODY MASS INDEX: 30.21 KG/M2

## 2025-08-13 DIAGNOSIS — G47.19 EXCESSIVE DAYTIME SLEEPINESS: ICD-10-CM

## 2025-08-13 DIAGNOSIS — R07.89 CHEST TIGHTNESS: Primary | ICD-10-CM

## 2025-08-13 DIAGNOSIS — I49.1 PAC (PREMATURE ATRIAL CONTRACTION): ICD-10-CM

## 2025-08-13 DIAGNOSIS — F41.1 ANXIETY STATE: ICD-10-CM

## 2025-08-13 DIAGNOSIS — R07.9 CHEST PAIN, UNSPECIFIED TYPE: ICD-10-CM

## 2025-08-13 DIAGNOSIS — F41.9 ANXIETY: ICD-10-CM

## 2025-08-13 DIAGNOSIS — D45 PRIMARY POLYCYTHEMIA (HCC): ICD-10-CM

## 2025-08-13 DIAGNOSIS — I45.10 RBBB: ICD-10-CM

## 2025-08-13 DIAGNOSIS — E11.65 UNCONTROLLED TYPE 2 DIABETES MELLITUS WITH HYPERGLYCEMIA (HCC): Primary | ICD-10-CM

## 2025-08-13 DIAGNOSIS — Z11.4 SCREENING FOR HIV (HUMAN IMMUNODEFICIENCY VIRUS): ICD-10-CM

## 2025-08-13 LAB
ALBUMIN SERPL-MCNC: 4.5 G/DL (ref 3.4–5)
ALBUMIN/GLOB SERPL: 2 {RATIO} (ref 1.1–2.2)
ALP SERPL-CCNC: 140 U/L (ref 40–129)
ALT SERPL-CCNC: 39 U/L (ref 10–40)
ANION GAP SERPL CALCULATED.3IONS-SCNC: 13 MMOL/L (ref 9–17)
AST SERPL-CCNC: 33 U/L (ref 15–37)
BASOPHILS # BLD: 0.08 K/UL
BASOPHILS NFR BLD: 2 % (ref 0–1)
BILIRUB SERPL-MCNC: 0.8 MG/DL (ref 0–1)
BUN SERPL-MCNC: 22 MG/DL (ref 7–20)
CALCIUM SERPL-MCNC: 9.5 MG/DL (ref 8.3–10.6)
CHLORIDE SERPL-SCNC: 102 MMOL/L (ref 99–110)
CO2 SERPL-SCNC: 24 MMOL/L (ref 21–32)
CREAT SERPL-MCNC: 1.3 MG/DL (ref 0.8–1.3)
EKG ATRIAL RATE: 101 BPM
EKG DIAGNOSIS: NORMAL
EKG P AXIS: 48 DEGREES
EKG P-R INTERVAL: 148 MS
EKG Q-T INTERVAL: 404 MS
EKG QRS DURATION: 136 MS
EKG QTC CALCULATION (BAZETT): 523 MS
EKG R AXIS: -63 DEGREES
EKG T AXIS: 15 DEGREES
EKG VENTRICULAR RATE: 101 BPM
EOSINOPHIL # BLD: 0.08 K/UL
EOSINOPHILS RELATIVE PERCENT: 2 % (ref 0–3)
ERYTHROCYTE [DISTWIDTH] IN BLOOD BY AUTOMATED COUNT: 15 % (ref 11.7–14.9)
GFR, ESTIMATED: 55 ML/MIN/1.73M2
GLUCOSE SERPL-MCNC: 144 MG/DL (ref 74–99)
HCT VFR BLD AUTO: 52.5 % (ref 42–52)
HGB BLD-MCNC: 17.9 G/DL (ref 13.5–18)
IMM GRANULOCYTES # BLD AUTO: 0.03 K/UL
IMM GRANULOCYTES NFR BLD: 1 %
LYMPHOCYTES NFR BLD: 1.11 K/UL
LYMPHOCYTES RELATIVE PERCENT: 20 % (ref 24–44)
MCH RBC QN AUTO: 34.7 PG (ref 27–31)
MCHC RBC AUTO-ENTMCNC: 34.1 G/DL (ref 32–36)
MCV RBC AUTO: 101.7 FL (ref 78–100)
MONOCYTES NFR BLD: 0.4 K/UL
MONOCYTES NFR BLD: 7 % (ref 0–5)
NEUTROPHILS NFR BLD: 69 % (ref 36–66)
NEUTS SEG NFR BLD: 3.76 K/UL
PLATELET # BLD AUTO: 435 K/UL (ref 140–440)
PMV BLD AUTO: 9.1 FL (ref 7.5–11.1)
POTASSIUM SERPL-SCNC: 4.6 MMOL/L (ref 3.5–5.1)
PROT SERPL-MCNC: 6.9 G/DL (ref 6.4–8.2)
RBC # BLD AUTO: 5.16 M/UL (ref 4.6–6.2)
SODIUM SERPL-SCNC: 139 MMOL/L (ref 136–145)
TROPONIN I SERPL HS-MCNC: 16 NG/L (ref 0–22)
TROPONIN I SERPL HS-MCNC: 18 NG/L (ref 0–22)
WBC OTHER # BLD: 5.5 K/UL (ref 4–10.5)

## 2025-08-13 PROCEDURE — 93010 ELECTROCARDIOGRAM REPORT: CPT | Performed by: INTERNAL MEDICINE

## 2025-08-13 PROCEDURE — 71045 X-RAY EXAM CHEST 1 VIEW: CPT

## 2025-08-13 PROCEDURE — 6370000000 HC RX 637 (ALT 250 FOR IP): Performed by: EMERGENCY MEDICINE

## 2025-08-13 PROCEDURE — 99285 EMERGENCY DEPT VISIT HI MDM: CPT

## 2025-08-13 PROCEDURE — 80053 COMPREHEN METABOLIC PANEL: CPT

## 2025-08-13 PROCEDURE — 84484 ASSAY OF TROPONIN QUANT: CPT

## 2025-08-13 PROCEDURE — 85025 COMPLETE CBC W/AUTO DIFF WBC: CPT

## 2025-08-13 PROCEDURE — 93005 ELECTROCARDIOGRAM TRACING: CPT | Performed by: EMERGENCY MEDICINE

## 2025-08-13 RX ORDER — LORAZEPAM 1 MG/1
1 TABLET ORAL ONCE
Status: COMPLETED | OUTPATIENT
Start: 2025-08-13 | End: 2025-08-13

## 2025-08-13 RX ORDER — ASPIRIN 81 MG/1
324 TABLET, CHEWABLE ORAL ONCE
Status: COMPLETED | OUTPATIENT
Start: 2025-08-13 | End: 2025-08-13

## 2025-08-13 RX ADMIN — LORAZEPAM 1 MG: 1 TABLET ORAL at 12:19

## 2025-08-13 RX ADMIN — ASPIRIN 81 MG 324 MG: 81 TABLET ORAL at 12:20

## 2025-08-13 ASSESSMENT — PAIN SCALES - GENERAL: PAINLEVEL_OUTOF10: 0

## 2025-08-13 ASSESSMENT — HEART SCORE: ECG: NON-SPECIFC REPOLARIZATION DISTURBANCE/LBTB/PM

## 2025-08-13 ASSESSMENT — PAIN - FUNCTIONAL ASSESSMENT: PAIN_FUNCTIONAL_ASSESSMENT: 0-10

## 2025-08-14 ENCOUNTER — TELEPHONE (OUTPATIENT)
Dept: CARDIOLOGY CLINIC | Age: 65
End: 2025-08-14

## 2025-08-25 ENCOUNTER — OFFICE VISIT (OUTPATIENT)
Dept: CARDIOLOGY CLINIC | Age: 65
End: 2025-08-25
Payer: COMMERCIAL

## 2025-08-25 VITALS
HEART RATE: 92 BPM | BODY MASS INDEX: 30.36 KG/M2 | DIASTOLIC BLOOD PRESSURE: 82 MMHG | WEIGHT: 205 LBS | HEIGHT: 69 IN | SYSTOLIC BLOOD PRESSURE: 138 MMHG

## 2025-08-25 DIAGNOSIS — R94.31 ABNORMAL ELECTROCARDIOGRAPHY: ICD-10-CM

## 2025-08-25 DIAGNOSIS — Z82.49 FAMILY HISTORY OF EARLY CAD: ICD-10-CM

## 2025-08-25 DIAGNOSIS — I45.10 RBBB: ICD-10-CM

## 2025-08-25 DIAGNOSIS — I25.10 ASCVD (ARTERIOSCLEROTIC CARDIOVASCULAR DISEASE): Primary | ICD-10-CM

## 2025-08-25 DIAGNOSIS — F41.9 ANXIETY: ICD-10-CM

## 2025-08-25 DIAGNOSIS — E11.65 UNCONTROLLED TYPE 2 DIABETES MELLITUS WITH HYPERGLYCEMIA (HCC): ICD-10-CM

## 2025-08-25 PROCEDURE — 99204 OFFICE O/P NEW MOD 45 MIN: CPT | Performed by: INTERNAL MEDICINE

## 2025-08-27 ENCOUNTER — OFFICE VISIT (OUTPATIENT)
Age: 65
End: 2025-08-27
Payer: COMMERCIAL

## 2025-08-27 VITALS
HEART RATE: 60 BPM | RESPIRATION RATE: 20 BRPM | OXYGEN SATURATION: 98 % | DIASTOLIC BLOOD PRESSURE: 84 MMHG | WEIGHT: 206.8 LBS | SYSTOLIC BLOOD PRESSURE: 160 MMHG | BODY MASS INDEX: 30.54 KG/M2

## 2025-08-27 DIAGNOSIS — E11.65 UNCONTROLLED TYPE 2 DIABETES MELLITUS WITH HYPERGLYCEMIA (HCC): Primary | ICD-10-CM

## 2025-08-27 DIAGNOSIS — F41.9 ANXIETY: ICD-10-CM

## 2025-08-27 DIAGNOSIS — D45 PRIMARY POLYCYTHEMIA (HCC): ICD-10-CM

## 2025-08-27 DIAGNOSIS — I45.10 RBBB: ICD-10-CM

## 2025-08-27 PROCEDURE — 99214 OFFICE O/P EST MOD 30 MIN: CPT

## 2025-08-27 PROCEDURE — G2211 COMPLEX E/M VISIT ADD ON: HCPCS

## 2025-08-27 RX ORDER — HYDROXYZINE HYDROCHLORIDE 25 MG/1
25 TABLET, FILM COATED ORAL EVERY 8 HOURS PRN
Qty: 30 TABLET | Refills: 0 | Status: SHIPPED | OUTPATIENT
Start: 2025-08-27 | End: 2025-09-06